# Patient Record
Sex: FEMALE | Race: WHITE | NOT HISPANIC OR LATINO | Employment: UNEMPLOYED | URBAN - METROPOLITAN AREA
[De-identification: names, ages, dates, MRNs, and addresses within clinical notes are randomized per-mention and may not be internally consistent; named-entity substitution may affect disease eponyms.]

---

## 2019-06-13 RX ORDER — LEVOTHYROXINE SODIUM 75 UG/1
TABLET ORAL
Qty: 30 TABLET | Refills: 6 | Status: SHIPPED | OUTPATIENT
Start: 2019-06-13 | End: 2020-02-03

## 2019-06-13 RX ORDER — ALBUTEROL SULFATE 90 UG/1
AEROSOL, METERED RESPIRATORY (INHALATION)
Qty: 9 EACH | Refills: 5 | Status: SHIPPED | OUTPATIENT
Start: 2019-06-13 | End: 2019-10-08 | Stop reason: SDUPTHER

## 2019-09-18 ENCOUNTER — TELEPHONE (OUTPATIENT)
Dept: PRIMARY CARE CLINIC | Facility: CLINIC | Age: 59
End: 2019-09-18

## 2019-09-18 DIAGNOSIS — R73.09 ABNORMAL GLUCOSE TOLERANCE TEST: ICD-10-CM

## 2019-09-18 DIAGNOSIS — E78.00 HYPERCHOLESTEREMIA: Primary | ICD-10-CM

## 2019-09-18 DIAGNOSIS — E03.9 HYPOTHYROIDISM, UNSPECIFIED TYPE: ICD-10-CM

## 2019-09-18 NOTE — TELEPHONE ENCOUNTER
"She would like to have the "regular lab work orders put in for my wellness exam.I also want an A1C and and my thyroid checked ."  "

## 2019-09-23 ENCOUNTER — OFFICE VISIT (OUTPATIENT)
Dept: PRIMARY CARE CLINIC | Facility: CLINIC | Age: 59
End: 2019-09-23
Payer: COMMERCIAL

## 2019-09-23 VITALS
RESPIRATION RATE: 14 BRPM | WEIGHT: 179 LBS | DIASTOLIC BLOOD PRESSURE: 62 MMHG | HEART RATE: 73 BPM | SYSTOLIC BLOOD PRESSURE: 120 MMHG | OXYGEN SATURATION: 96 %

## 2019-09-23 DIAGNOSIS — R10.32 LLQ PAIN: ICD-10-CM

## 2019-09-23 DIAGNOSIS — K57.92 DIVERTICULITIS: Primary | ICD-10-CM

## 2019-09-23 PROBLEM — J45.909 ASTHMA: Status: ACTIVE | Noted: 2019-09-23

## 2019-09-23 PROBLEM — K21.9 GASTROESOPHAGEAL REFLUX DISEASE: Status: ACTIVE | Noted: 2019-09-23

## 2019-09-23 PROBLEM — L30.9 ECZEMA: Status: ACTIVE | Noted: 2019-09-23

## 2019-09-23 PROBLEM — E55.9 VITAMIN D DEFICIENCY: Status: ACTIVE | Noted: 2019-09-23

## 2019-09-23 PROBLEM — D64.9 ANEMIA: Status: ACTIVE | Noted: 2019-09-23

## 2019-09-23 PROBLEM — E03.9 HYPOTHYROIDISM: Status: ACTIVE | Noted: 2019-09-23

## 2019-09-23 PROBLEM — J30.9 ALLERGIC RHINITIS: Status: ACTIVE | Noted: 2019-09-23

## 2019-09-23 PROCEDURE — 99214 PR OFFICE/OUTPT VISIT, EST, LEVL IV, 30-39 MIN: ICD-10-PCS | Mod: S$GLB,,, | Performed by: FAMILY MEDICINE

## 2019-09-23 PROCEDURE — 99214 OFFICE O/P EST MOD 30 MIN: CPT | Mod: S$GLB,,, | Performed by: FAMILY MEDICINE

## 2019-09-23 RX ORDER — PNV NO.95/FERROUS FUM/FOLIC AC 28MG-0.8MG
100 TABLET ORAL DAILY
COMMUNITY

## 2019-09-23 RX ORDER — ASPIRIN 81 MG/1
81 TABLET ORAL DAILY
COMMUNITY

## 2019-09-23 RX ORDER — AMOXICILLIN AND CLAVULANATE POTASSIUM 875; 125 MG/1; MG/1
1 TABLET, FILM COATED ORAL EVERY 12 HOURS
Qty: 28 TABLET | Refills: 0 | Status: SHIPPED | OUTPATIENT
Start: 2019-09-23 | End: 2019-10-03 | Stop reason: SDUPTHER

## 2019-09-23 RX ORDER — MAGNESIUM 30 MG
TABLET ORAL ONCE
COMMUNITY

## 2019-09-23 RX ORDER — CHOLECALCIFEROL (VITAMIN D3) 25 MCG
1000 TABLET ORAL DAILY
COMMUNITY

## 2019-09-23 RX ORDER — CETIRIZINE HYDROCHLORIDE 10 MG/1
10 TABLET ORAL DAILY
COMMUNITY

## 2019-09-23 NOTE — PROGRESS NOTES
"Subjective:       Patient ID: Crystal Quintero is a 58 y.o. female.    Chief Complaint: Constipation (She has diverticulosis. She said she believes she has had a "flare up" since Friday but has not been diaganosed with it. She has had pain,bloating,  and  fever. Friday she took advil.)    Patient presents with abdominal pain.  She has been having this over the last couple of years.  It is presumed to be diverticulitis.  She has seen Dr. Cochran multiple times for this.  She has not had the colonoscopy or the CT due to financial concerns as well as the fact that her insurance takes a while to get a prior authorization and it is only good for 30 days.  Dr. Cochran wants her to get it in the middle of a flare.  This has proven difficult.  The frequency of her flare ups have increased.  It used to be every 6 months and now every 3 months and this last time 4 weeks.  She has never taken antibiotics for it due to fears of complications and side effects.  She has never required hospitalization.  Her symptoms include nausea anorexia and abdominal pain.  Usually it is on the right but this time it is on the left lower quadrant.  She denies any diarrhea.  She states she may have a mild case of constipation on and off.  Her diet is not changed.  She states it is probably low in fiber .  She does state that when she does avid exercise is it tends to make it worse but not always.  She does not associated with any particular foods.  She denies any heartburn.  She does state that she usually has a low-grade temp with it.  Her sister has diverticulitis and was recently treated with Augmentin.  She states that this helped a lot and she is willing to take this if this is something that she could take.    Review of Systems   Constitutional: Negative for chills, fatigue, fever and unexpected weight change.   Eyes: Negative for visual disturbance.   Respiratory: Negative for cough, shortness of breath and wheezing.    Cardiovascular: Negative " for chest pain and palpitations.   Gastrointestinal: Positive for abdominal distention, abdominal pain and constipation. Negative for blood in stool.   Genitourinary: Negative for difficulty urinating and dysuria.   Musculoskeletal: Negative for back pain.   Skin: Negative for rash.   Neurological: Negative for dizziness, syncope, light-headedness, numbness and headaches.   Hematological: Negative for adenopathy.   Psychiatric/Behavioral: Negative for dysphoric mood. The patient is not nervous/anxious.      Medication List with Changes/Refills   New Medications    AMOXICILLIN-CLAVULANATE 875-125MG (AUGMENTIN) 875-125 MG PER TABLET    Take 1 tablet by mouth every 12 (twelve) hours.   Current Medications    ALBUTEROL (PROVENTIL/VENTOLIN HFA) 90 MCG/ACTUATION INHALER    INHALE TWO PUFFS BY MOUTH EVERY 4 HOURS AS NEEDED    ASCORBIC ACID, VITAMIN C, (VITAMIN C) 100 MG TABLET    Take 100 mg by mouth once daily.    ASPIRIN (ECOTRIN) 81 MG EC TABLET    Take 81 mg by mouth once daily.    CETIRIZINE (ZYRTEC) 10 MG TABLET    Take 10 mg by mouth once daily.    CYANOCOBALAMIN (VITAMIN B-12) 100 MCG TABLET    Take 100 mcg by mouth once daily.    MAGNESIUM 30 MG TAB    Take by mouth once.    PHYTONADIONE, VIT K1, (VITAMIN K1 MISC)    by Misc.(Non-Drug; Combo Route) route.    SYNTHROID 75 MCG TABLET    TAKE 1 TABLET BY MOUTH ONCE DAILY    VITAMIN D (VITAMIN D3) 1000 UNITS TAB    Take 1,000 Units by mouth once daily.    VITAMIN E 100 UNIT CAPSULE    Take 100 Units by mouth once daily.         Objective:      /62   Pulse 73   Resp 14   Wt 81.2 kg (179 lb)   SpO2 96%   There is no height or weight on file to calculate BMI.  Physical Exam   Constitutional: She is oriented to person, place, and time. She appears well-developed and well-nourished.   HENT:   Head: Normocephalic and atraumatic.   Eyes: Conjunctivae and EOM are normal.   Neck: Neck supple. No thyromegaly present.   Cardiovascular: Normal rate, regular rhythm and  intact distal pulses.   No murmur heard.  Pulmonary/Chest: Effort normal and breath sounds normal.   Abdominal: Soft. Bowel sounds are normal. She exhibits no mass. There is tenderness (Right lower quadrant mildly.  ). There is no rebound and no guarding.   Musculoskeletal: Normal range of motion.   Neurological: She is alert and oriented to person, place, and time.   Skin: Skin is dry. No rash noted.   Psychiatric: She has a normal mood and affect.   Vitals reviewed.      Assessment:       Problem List Items Addressed This Visit     None      Visit Diagnoses     Diverticulitis    -  Primary    Relevant Medications    amoxicillin-clavulanate 875-125mg (AUGMENTIN) 875-125 mg per tablet    LLQ pain        Relevant Orders    CT Abdomen Pelvis  Without Contrast    CBC auto differential    Comprehensive metabolic panel            Plan:       Diverticulitis  -     amoxicillin-clavulanate 875-125mg (AUGMENTIN) 875-125 mg per tablet; Take 1 tablet by mouth every 12 (twelve) hours.  Dispense: 28 tablet; Refill: 0    LLQ pain  -     CT Abdomen Pelvis  Without Contrast; Future; Expected date: 09/23/2019  -     CBC auto differential; Future; Expected date: 09/23/2019  -     Comprehensive metabolic panel; Future; Expected date: 09/23/2019              DISCUSSION:  We will cover with Augmentin after a long discussion on the pros and cons of this.  Order a CT scan and have this pending for another flare up.  Get blood work at her next visit for her annual wellness.  We discussed at length importance of plenty of fluids and increasing her vegetable intake.  We discussed for her to go extremely slowly with this.

## 2019-10-02 ENCOUNTER — TELEPHONE (OUTPATIENT)
Dept: PRIMARY CARE CLINIC | Facility: CLINIC | Age: 59
End: 2019-10-02

## 2019-10-02 DIAGNOSIS — K57.92 DIVERTICULITIS: ICD-10-CM

## 2019-10-02 NOTE — TELEPHONE ENCOUNTER
"She would like a refill on her Augmentin. She said it helped when it was given to her on 0923/19. She would like one more week to be called out because she feels like she is " not completely better."  "

## 2019-10-03 RX ORDER — AMOXICILLIN AND CLAVULANATE POTASSIUM 875; 125 MG/1; MG/1
1 TABLET, FILM COATED ORAL EVERY 12 HOURS
Qty: 28 TABLET | Refills: 0 | Status: SHIPPED | OUTPATIENT
Start: 2019-10-03 | End: 2019-11-05 | Stop reason: SDUPTHER

## 2019-10-08 DIAGNOSIS — J45.909 ASTHMA, UNSPECIFIED ASTHMA SEVERITY, UNSPECIFIED WHETHER COMPLICATED, UNSPECIFIED WHETHER PERSISTENT: Primary | ICD-10-CM

## 2019-10-08 RX ORDER — ALBUTEROL SULFATE 90 UG/1
2 AEROSOL, METERED RESPIRATORY (INHALATION) EVERY 4 HOURS PRN
Qty: 9 EACH | Refills: 5 | Status: SHIPPED | OUTPATIENT
Start: 2019-10-08 | End: 2020-10-26 | Stop reason: ALTCHOICE

## 2019-10-09 DIAGNOSIS — J45.909 ASTHMA, UNSPECIFIED ASTHMA SEVERITY, UNSPECIFIED WHETHER COMPLICATED, UNSPECIFIED WHETHER PERSISTENT: Primary | ICD-10-CM

## 2019-10-09 RX ORDER — ALBUTEROL SULFATE 90 UG/1
2 AEROSOL, METERED RESPIRATORY (INHALATION) EVERY 6 HOURS PRN
Qty: 18 G | Refills: 6 | Status: SHIPPED | OUTPATIENT
Start: 2019-10-09 | End: 2019-11-05 | Stop reason: SDUPTHER

## 2019-10-09 RX ORDER — ALBUTEROL SULFATE 90 UG/1
2 AEROSOL, METERED RESPIRATORY (INHALATION) EVERY 6 HOURS PRN
COMMUNITY
End: 2019-10-09 | Stop reason: SDUPTHER

## 2019-10-26 LAB
ALBUMIN SERPL-MCNC: 4.1 G/DL (ref 3.6–5.1)
ALBUMIN/GLOB SERPL: 1.3 (CALC) (ref 1–2.5)
ALP SERPL-CCNC: 47 U/L (ref 33–130)
ALT SERPL-CCNC: 19 U/L (ref 6–29)
AST SERPL-CCNC: 17 U/L (ref 10–35)
BASOPHILS # BLD AUTO: 41 CELLS/UL (ref 0–200)
BASOPHILS NFR BLD AUTO: 0.6 %
BILIRUB SERPL-MCNC: 0.9 MG/DL (ref 0.2–1.2)
BUN SERPL-MCNC: 8 MG/DL (ref 7–25)
BUN/CREAT SERPL: NORMAL (CALC) (ref 6–22)
CALCIUM SERPL-MCNC: 9.3 MG/DL (ref 8.6–10.4)
CHLORIDE SERPL-SCNC: 102 MMOL/L (ref 98–110)
CHOLEST SERPL-MCNC: 222 MG/DL
CHOLEST/HDLC SERPL: 4 (CALC)
CO2 SERPL-SCNC: 28 MMOL/L (ref 20–32)
CREAT SERPL-MCNC: 0.87 MG/DL (ref 0.5–1.05)
EOSINOPHIL # BLD AUTO: 531 CELLS/UL (ref 15–500)
EOSINOPHIL NFR BLD AUTO: 7.7 %
ERYTHROCYTE [DISTWIDTH] IN BLOOD BY AUTOMATED COUNT: 12.8 % (ref 11–15)
GFRSERPLBLD MDRD-ARVRAT: 74 ML/MIN/1.73M2
GLOBULIN SER CALC-MCNC: 3.1 G/DL (CALC) (ref 1.9–3.7)
GLUCOSE SERPL-MCNC: 84 MG/DL (ref 65–99)
HBA1C MFR BLD: 6.1 % OF TOTAL HGB
HCT VFR BLD AUTO: 39.2 % (ref 35–45)
HDLC SERPL-MCNC: 55 MG/DL
HGB BLD-MCNC: 13.6 G/DL (ref 11.7–15.5)
LDLC SERPL CALC-MCNC: 145 MG/DL (CALC)
LYMPHOCYTES # BLD AUTO: 1691 CELLS/UL (ref 850–3900)
LYMPHOCYTES NFR BLD AUTO: 24.5 %
MCH RBC QN AUTO: 30.3 PG (ref 27–33)
MCHC RBC AUTO-ENTMCNC: 34.7 G/DL (ref 32–36)
MCV RBC AUTO: 87.3 FL (ref 80–100)
MONOCYTES # BLD AUTO: 380 CELLS/UL (ref 200–950)
MONOCYTES NFR BLD AUTO: 5.5 %
NEUTROPHILS # BLD AUTO: 4257 CELLS/UL (ref 1500–7800)
NEUTROPHILS NFR BLD AUTO: 61.7 %
NONHDLC SERPL-MCNC: 167 MG/DL (CALC)
PLATELET # BLD AUTO: 332 THOUSAND/UL (ref 140–400)
PMV BLD REES-ECKER: 9.4 FL (ref 7.5–12.5)
POTASSIUM SERPL-SCNC: 4.4 MMOL/L (ref 3.5–5.3)
PROT SERPL-MCNC: 7.2 G/DL (ref 6.1–8.1)
RBC # BLD AUTO: 4.49 MILLION/UL (ref 3.8–5.1)
SODIUM SERPL-SCNC: 139 MMOL/L (ref 135–146)
T4 FREE SERPL-MCNC: 1 NG/DL (ref 0.8–1.8)
TRIGL SERPL-MCNC: 104 MG/DL
TSH SERPL-ACNC: 9.87 MIU/L (ref 0.4–4.5)
WBC # BLD AUTO: 6.9 THOUSAND/UL (ref 3.8–10.8)

## 2019-11-05 ENCOUNTER — OFFICE VISIT (OUTPATIENT)
Dept: PRIMARY CARE CLINIC | Facility: CLINIC | Age: 59
End: 2019-11-05
Payer: COMMERCIAL

## 2019-11-05 VITALS
HEIGHT: 66 IN | OXYGEN SATURATION: 98 % | SYSTOLIC BLOOD PRESSURE: 100 MMHG | RESPIRATION RATE: 14 BRPM | DIASTOLIC BLOOD PRESSURE: 72 MMHG | BODY MASS INDEX: 28.93 KG/M2 | HEART RATE: 76 BPM | WEIGHT: 180 LBS

## 2019-11-05 DIAGNOSIS — R73.09 ABNORMAL GLUCOSE: ICD-10-CM

## 2019-11-05 DIAGNOSIS — K57.92 DIVERTICULITIS: ICD-10-CM

## 2019-11-05 DIAGNOSIS — Z00.00 WELLNESS EXAMINATION: Primary | ICD-10-CM

## 2019-11-05 DIAGNOSIS — E03.9 HYPOTHYROIDISM, UNSPECIFIED TYPE: ICD-10-CM

## 2019-11-05 DIAGNOSIS — E78.00 PURE HYPERCHOLESTEROLEMIA: ICD-10-CM

## 2019-11-05 DIAGNOSIS — J45.909 ASTHMA, UNSPECIFIED ASTHMA SEVERITY, UNSPECIFIED WHETHER COMPLICATED, UNSPECIFIED WHETHER PERSISTENT: ICD-10-CM

## 2019-11-05 PROCEDURE — 99396 PREV VISIT EST AGE 40-64: CPT | Mod: S$GLB,,, | Performed by: FAMILY MEDICINE

## 2019-11-05 PROCEDURE — 99396 PR PREVENTIVE VISIT,EST,40-64: ICD-10-PCS | Mod: S$GLB,,, | Performed by: FAMILY MEDICINE

## 2019-11-05 RX ORDER — AMOXICILLIN AND CLAVULANATE POTASSIUM 875; 125 MG/1; MG/1
1 TABLET, FILM COATED ORAL EVERY 12 HOURS
Qty: 28 TABLET | Refills: 0 | Status: SHIPPED | OUTPATIENT
Start: 2019-11-05 | End: 2020-02-19 | Stop reason: SDUPTHER

## 2019-11-05 RX ORDER — ALBUTEROL SULFATE 90 UG/1
2 AEROSOL, METERED RESPIRATORY (INHALATION) EVERY 6 HOURS PRN
Qty: 18 G | Refills: 11 | Status: SHIPPED | OUTPATIENT
Start: 2019-11-05 | End: 2020-11-11 | Stop reason: SDUPTHER

## 2019-11-05 NOTE — PROGRESS NOTES
Subjective:       Patient ID: Crystal Quintero is a 58 y.o. female.    Chief Complaint: Follow-up    Patient presents for her annual wellness exam.  She states overall she is feeling fairly well except for diverticulitis that has been on and off.  She is set to have a colonoscopy, but she has to be without any diverticulitis symptoms.  She has not been able to coordinate this with the GI.  She is currently getting over an other case of it.  Augmentin helps every time.   Also with history of hypothyroidism.  She denies any symptoms of this.  She is still on the 75 mcg of Synthroid.  She has blood work pending.  Also with history of pure hypercholesterolemia.  She is not on a statin medication.  It is time to recheck this and she has blood work pending.  She states her diet is very poor and she does not exercise.  Also with the history of diabetes in her 6 siblings and an abnormal glucose in the past.  Her last A1c that I have records for his 2011 and it was 6.1.  She has blood work pending.  Also with history of asthma.  She states this has been fairly well controlled.  She does need more ProAir HFA.  She states that she needs the brand name as the generic brand does not seem to help and makes her feel differently.          Review of Systems   Constitutional: Negative for chills, fatigue, fever and unexpected weight change.   Eyes: Negative for visual disturbance.   Respiratory: Negative for cough, shortness of breath and wheezing.    Cardiovascular: Negative for chest pain and palpitations.   Gastrointestinal: Positive for abdominal pain. Negative for blood in stool.   Genitourinary: Negative for difficulty urinating and dysuria.   Musculoskeletal: Negative for back pain.   Skin: Negative for rash.   Neurological: Negative for dizziness, syncope, light-headedness, numbness and headaches.   Hematological: Negative for adenopathy.   Psychiatric/Behavioral: Negative for dysphoric mood. The patient is not nervous/anxious.   "    Medication List with Changes/Refills   Current Medications    ALBUTEROL (PROVENTIL/VENTOLIN HFA) 90 MCG/ACTUATION INHALER    Inhale 2 puffs into the lungs every 4 (four) hours as needed. Rescue    ASCORBIC ACID, VITAMIN C, (VITAMIN C) 100 MG TABLET    Take 100 mg by mouth once daily.    ASPIRIN (ECOTRIN) 81 MG EC TABLET    Take 81 mg by mouth once daily.    CETIRIZINE (ZYRTEC) 10 MG TABLET    Take 10 mg by mouth once daily.    CYANOCOBALAMIN (VITAMIN B-12) 100 MCG TABLET    Take 100 mcg by mouth once daily.    MAGNESIUM 30 MG TAB    Take by mouth once.    PHYTONADIONE, VIT K1, (VITAMIN K1 MISC)    by Misc.(Non-Drug; Combo Route) route.    SYNTHROID 75 MCG TABLET    TAKE 1 TABLET BY MOUTH ONCE DAILY    VITAMIN D (VITAMIN D3) 1000 UNITS TAB    Take 1,000 Units by mouth once daily.    VITAMIN E 100 UNIT CAPSULE    Take 100 Units by mouth once daily.   Changed and/or Refilled Medications    Modified Medication Previous Medication    ALBUTEROL (PROAIR HFA) 90 MCG/ACTUATION INHALER albuterol (PROAIR HFA) 90 mcg/actuation inhaler       Inhale 2 puffs into the lungs every 6 (six) hours as needed for Wheezing. Rescue    Inhale 2 puffs into the lungs every 6 (six) hours as needed for Wheezing. Rescue    AMOXICILLIN-CLAVULANATE 875-125MG (AUGMENTIN) 875-125 MG PER TABLET amoxicillin-clavulanate 875-125mg (AUGMENTIN) 875-125 mg per tablet       Take 1 tablet by mouth every 12 (twelve) hours.    Take 1 tablet by mouth every 12 (twelve) hours.         Objective:      /72   Pulse 76   Resp 14   Ht 5' 6" (1.676 m)   Wt 81.6 kg (180 lb)   SpO2 98%   BMI 29.05 kg/m²   Estimated body mass index is 29.05 kg/m² as calculated from the following:    Height as of this encounter: 5' 6" (1.676 m).    Weight as of this encounter: 81.6 kg (180 lb).  Physical Exam   Constitutional: She is oriented to person, place, and time. She appears well-developed and well-nourished.   obese   HENT:   Head: Normocephalic and atraumatic. "   Eyes: Conjunctivae and EOM are normal.   Neck: Neck supple. No thyromegaly present.   Cardiovascular: Normal rate, regular rhythm and intact distal pulses.   No murmur heard.  Pulmonary/Chest: Effort normal and breath sounds normal.   Abdominal: Soft. Bowel sounds are normal. She exhibits no mass. There is tenderness (Minimally diffusely). There is no rebound and no guarding.   Musculoskeletal: Normal range of motion.   Neurological: She is alert and oriented to person, place, and time.   Skin: Skin is dry. No rash noted.   Psychiatric: She has a normal mood and affect.   Vitals reviewed.      Assessment:       Problem List Items Addressed This Visit        Pulmonary    Asthma    Relevant Medications    albuterol (PROAIR HFA) 90 mcg/actuation inhaler       Endocrine    Hypothyroidism    Relevant Orders    TSH    T3, free    T4, free      Other Visit Diagnoses     Wellness examination    -  Primary    Pure hypercholesterolemia        Relevant Orders    Lipid panel    Abnormal glucose        Relevant Orders    Hemoglobin A1c    Diverticulitis        Relevant Medications    amoxicillin-clavulanate 875-125mg (AUGMENTIN) 875-125 mg per tablet            Plan:       Wellness examination    Hypothyroidism, unspecified type  -     TSH; Future; Expected date: 03/02/2020  -     T3, free; Future; Expected date: 03/02/2020  -     T4, free; Future; Expected date: 03/02/2020    Pure hypercholesterolemia  -     Lipid panel; Future; Expected date: 03/02/2020    Abnormal glucose  -     Hemoglobin A1c; Future; Expected date: 03/02/2020    Asthma, unspecified asthma severity, unspecified whether complicated, unspecified whether persistent  -     albuterol (PROAIR HFA) 90 mcg/actuation inhaler; Inhale 2 puffs into the lungs every 6 (six) hours as needed for Wheezing. Rescue  Dispense: 18 g; Refill: 11    Diverticulitis  -     amoxicillin-clavulanate 875-125mg (AUGMENTIN) 875-125 mg per tablet; Take 1 tablet by mouth every 12 (twelve)  hours.  Dispense: 28 tablet; Refill: 0              DISCUSSION:  Blood work looks pretty well except for the A1c in the lipids.  We had a very long and detailed discussion on her diet and lack of exercise.  She will attempt these therapeutic lifestyle changes.  We will recheck her cholesterol and A1c in 3-4 months.  She is also asking for Augmentin to bring on vacation with her or to have at home in case she gets a flare-up on a weekend or a holiday.  We discussed the dangers of antibiotics in great detail.      Results for orders placed or performed in visit on 09/23/19   CBC auto differential   Result Value Ref Range    WBC 6.9 3.8 - 10.8 Thousand/uL    RBC 4.49 3.80 - 5.10 Million/uL    Hemoglobin 13.6 11.7 - 15.5 g/dL    Hematocrit 39.2 35.0 - 45.0 %    Mean Corpuscular Volume 87.3 80.0 - 100.0 fL    Mean Corpuscular Hemoglobin 30.3 27.0 - 33.0 pg    Mean Corpuscular Hemoglobin Conc 34.7 32.0 - 36.0 g/dL    RDW 12.8 11.0 - 15.0 %    Platelets 332 140 - 400 Thousand/uL    MPV 9.4 7.5 - 12.5 fL    Neutrophils Absolute 4,257 1,500 - 7,800 cells/uL    Lymph # 1,691 850 - 3,900 cells/uL    Mono # 380 200 - 950 cells/uL    Eos # 531 (H) 15 - 500 cells/uL    Baso # 41 0 - 200 cells/uL    Neutrophils Relative 61.7 %    Lymph% 24.5 %    Mono% 5.5 %    Eosinophil% 7.7 %    Basophil% 0.6 %   Comprehensive metabolic panel   Result Value Ref Range    Glucose 84 65 - 99 mg/dL    BUN, Bld 8 7 - 25 mg/dL    Creatinine 0.87 0.50 - 1.05 mg/dL    eGFR if non  74 > OR = 60 mL/min/1.73m2    eGFR if African American 86 > OR = 60 mL/min/1.73m2    BUN/Creatinine Ratio NOT APPLICABLE 6 - 22 (calc)    Sodium 139 135 - 146 mmol/L    Potassium 4.4 3.5 - 5.3 mmol/L    Chloride 102 98 - 110 mmol/L    CO2 28 20 - 32 mmol/L    Calcium 9.3 8.6 - 10.4 mg/dL    Total Protein 7.2 6.1 - 8.1 g/dL    Albumin 4.1 3.6 - 5.1 g/dL    Globulin, Total 3.1 1.9 - 3.7 g/dL (calc)    Albumin/Globulin Ratio 1.3 1.0 - 2.5 (calc)    Total  Bilirubin 0.9 0.2 - 1.2 mg/dL    Alkaline Phosphatase 47 33 - 130 U/L    AST 17 10 - 35 U/L    ALT 19 6 - 29 U/L

## 2019-11-21 DIAGNOSIS — Z91.09 ENVIRONMENTAL ALLERGIES: Primary | ICD-10-CM

## 2019-11-21 RX ORDER — MONTELUKAST SODIUM 10 MG/1
10 TABLET ORAL NIGHTLY
COMMUNITY
End: 2019-11-21 | Stop reason: SDUPTHER

## 2019-11-21 RX ORDER — MONTELUKAST SODIUM 10 MG/1
10 TABLET ORAL NIGHTLY
Qty: 90 TABLET | Refills: 3 | Status: SHIPPED | OUTPATIENT
Start: 2019-11-21 | End: 2020-12-28

## 2019-11-21 NOTE — TELEPHONE ENCOUNTER
Crystal called to get a refill on her Singulair.  States she stopped taking it due to insomnia but would like to restart it due to her allergies.

## 2020-01-20 ENCOUNTER — TELEPHONE (OUTPATIENT)
Dept: PRIMARY CARE CLINIC | Facility: CLINIC | Age: 60
End: 2020-01-20

## 2020-01-20 NOTE — TELEPHONE ENCOUNTER
----- Message from Brooke Chen sent at 1/20/2020  3:11 PM CST -----  Contact: Patient   Pt called and stated that she did blood work for an annual appt back in October and she was sent a bill from [facility]. She called [facility] and they told her the orders were not coded correctly and that Dr. Dimas would have to redo it to cancel out her bill

## 2020-01-20 NOTE — TELEPHONE ENCOUNTER
She just needs to bring the bill by. I was not back in the office yet when her orders were placed so I am unsure or what codes were put on the orders.

## 2020-02-03 RX ORDER — LEVOTHYROXINE SODIUM 75 UG/1
TABLET ORAL
Qty: 30 TABLET | Refills: 11 | Status: SHIPPED | OUTPATIENT
Start: 2020-02-03 | End: 2020-10-26 | Stop reason: ALTCHOICE

## 2020-02-19 ENCOUNTER — OFFICE VISIT (OUTPATIENT)
Dept: PRIMARY CARE CLINIC | Facility: CLINIC | Age: 60
End: 2020-02-19
Payer: COMMERCIAL

## 2020-02-19 VITALS
SYSTOLIC BLOOD PRESSURE: 106 MMHG | RESPIRATION RATE: 14 BRPM | HEART RATE: 85 BPM | DIASTOLIC BLOOD PRESSURE: 70 MMHG | WEIGHT: 179 LBS | BODY MASS INDEX: 28.77 KG/M2 | OXYGEN SATURATION: 97 % | HEIGHT: 66 IN

## 2020-02-19 DIAGNOSIS — J06.9 UPPER RESPIRATORY TRACT INFECTION, UNSPECIFIED TYPE: Primary | ICD-10-CM

## 2020-02-19 DIAGNOSIS — K57.92 DIVERTICULITIS: ICD-10-CM

## 2020-02-19 PROCEDURE — 3008F BODY MASS INDEX DOCD: CPT | Mod: CPTII,S$GLB,, | Performed by: FAMILY MEDICINE

## 2020-02-19 PROCEDURE — 99214 OFFICE O/P EST MOD 30 MIN: CPT | Mod: S$GLB,,, | Performed by: FAMILY MEDICINE

## 2020-02-19 PROCEDURE — 99214 PR OFFICE/OUTPT VISIT, EST, LEVL IV, 30-39 MIN: ICD-10-PCS | Mod: S$GLB,,, | Performed by: FAMILY MEDICINE

## 2020-02-19 PROCEDURE — 3008F PR BODY MASS INDEX (BMI) DOCUMENTED: ICD-10-PCS | Mod: CPTII,S$GLB,, | Performed by: FAMILY MEDICINE

## 2020-02-19 RX ORDER — AMOXICILLIN 500 MG/1
500 TABLET, FILM COATED ORAL EVERY 12 HOURS
Qty: 20 TABLET | Refills: 0 | Status: SHIPPED | OUTPATIENT
Start: 2020-02-19 | End: 2020-02-29

## 2020-02-19 RX ORDER — AMOXICILLIN AND CLAVULANATE POTASSIUM 875; 125 MG/1; MG/1
1 TABLET, FILM COATED ORAL EVERY 12 HOURS
Qty: 28 TABLET | Refills: 0 | Status: SHIPPED | OUTPATIENT
Start: 2020-02-19 | End: 2020-10-26

## 2020-02-19 RX ORDER — MONTELUKAST SODIUM 10 MG/1
10 TABLET ORAL NIGHTLY
COMMUNITY
End: 2020-10-26 | Stop reason: SDUPTHER

## 2020-02-19 NOTE — PROGRESS NOTES
Subjective:       Patient ID: Crystal Quintero is a 59 y.o. female.    Chief Complaint: Adenopathy    Pt with sore throat, sinus congestion, prod cough, chills.  X 8 days.  Hx of asthma.  Only mildly worse right now.  No ill contacts.  otc meds helping a little.  She denies any shortness of breath.  She states she feels a little bit better.  What really caused her concern was pain and swelling on the left side of her neck.    Review of Systems   Constitutional: Negative for chills, fatigue, fever and unexpected weight change.   HENT: Positive for congestion, sinus pressure and sore throat.    Eyes: Negative for visual disturbance.   Respiratory: Positive for cough. Negative for shortness of breath and wheezing.    Cardiovascular: Negative for chest pain and palpitations.   Gastrointestinal: Negative for abdominal pain and blood in stool.   Genitourinary: Negative for difficulty urinating and dysuria.   Musculoskeletal: Negative for back pain.   Skin: Negative for rash.   Neurological: Negative for dizziness, syncope, light-headedness, numbness and headaches.   Hematological: Negative for adenopathy.   Psychiatric/Behavioral: Negative for dysphoric mood. The patient is not nervous/anxious.      Medication List with Changes/Refills   New Medications    AMOXICILLIN (AMOXIL) 500 MG TAB    Take 1 tablet (500 mg total) by mouth every 12 (twelve) hours. for 10 days   Current Medications    ALBUTEROL (PROAIR HFA) 90 MCG/ACTUATION INHALER    Inhale 2 puffs into the lungs every 6 (six) hours as needed for Wheezing. Rescue    ALBUTEROL (PROVENTIL/VENTOLIN HFA) 90 MCG/ACTUATION INHALER    Inhale 2 puffs into the lungs every 4 (four) hours as needed. Rescue    ASCORBIC ACID, VITAMIN C, (VITAMIN C) 100 MG TABLET    Take 100 mg by mouth once daily.    ASPIRIN (ECOTRIN) 81 MG EC TABLET    Take 81 mg by mouth once daily.    CETIRIZINE (ZYRTEC) 10 MG TABLET    Take 10 mg by mouth once daily.    CYANOCOBALAMIN (VITAMIN B-12) 100 MCG TABLET  "   Take 100 mcg by mouth once daily.    MAGNESIUM 30 MG TAB    Take by mouth once.    MONTELUKAST (SINGULAIR) 10 MG TABLET    Take 1 tablet (10 mg total) by mouth every evening.    MONTELUKAST (SINGULAIR) 10 MG TABLET    Take 10 mg by mouth every evening.    PHYTONADIONE, VIT K1, (VITAMIN K1 MISC)    by Misc.(Non-Drug; Combo Route) route.    SYNTHROID 75 MCG TABLET    TAKE 1 TABLET BY MOUTH ONCE DAILY    VITAMIN D (VITAMIN D3) 1000 UNITS TAB    Take 1,000 Units by mouth once daily.    VITAMIN E 100 UNIT CAPSULE    Take 100 Units by mouth once daily.   Changed and/or Refilled Medications    Modified Medication Previous Medication    AMOXICILLIN-CLAVULANATE 875-125MG (AUGMENTIN) 875-125 MG PER TABLET amoxicillin-clavulanate 875-125mg (AUGMENTIN) 875-125 mg per tablet       Take 1 tablet by mouth every 12 (twelve) hours.    Take 1 tablet by mouth every 12 (twelve) hours.         Objective:      /70   Pulse 85   Resp 14   Ht 5' 6" (1.676 m)   Wt 81.2 kg (179 lb)   LMP  (LMP Unknown)   SpO2 97%   BMI 28.89 kg/m²   Estimated body mass index is 28.89 kg/m² as calculated from the following:    Height as of this encounter: 5' 6" (1.676 m).    Weight as of this encounter: 81.2 kg (179 lb).  Physical Exam   Constitutional: She is oriented to person, place, and time. She appears well-developed and well-nourished.   HENT:   Head: Normocephalic and atraumatic.   Right Ear: External ear normal.   Left Ear: External ear normal.   Nose: Nose normal.   Cobblestoning and a mild postnasal drip.   Eyes: Conjunctivae and EOM are normal.   Neck: No thyromegaly present.   Cardiovascular: Normal rate, regular rhythm and intact distal pulses.   No murmur heard.  Pulmonary/Chest: Effort normal and breath sounds normal.   Abdominal: Soft. Bowel sounds are normal. She exhibits no mass. There is no tenderness. There is no rebound and no guarding.   Musculoskeletal: Normal range of motion.   Lymphadenopathy:     She has cervical " adenopathy (left > right, ttp).   Neurological: She is alert and oriented to person, place, and time.   Skin: Skin is dry. No rash noted.   Psychiatric: She has a normal mood and affect.   Vitals reviewed.      Assessment:       Problem List Items Addressed This Visit     None      Visit Diagnoses     Upper respiratory tract infection, unspecified type    -  Primary    Relevant Medications    amoxicillin (AMOXIL) 500 MG Tab    Diverticulitis        Relevant Medications    amoxicillin-clavulanate 875-125mg (AUGMENTIN) 875-125 mg per tablet            Plan:       Upper respiratory tract infection, unspecified type  -     amoxicillin (AMOXIL) 500 MG Tab; Take 1 tablet (500 mg total) by mouth every 12 (twelve) hours. for 10 days  Dispense: 20 tablet; Refill: 0    Diverticulitis  -     amoxicillin-clavulanate 875-125mg (AUGMENTIN) 875-125 mg per tablet; Take 1 tablet by mouth every 12 (twelve) hours.  Dispense: 28 tablet; Refill: 0              DISCUSSION:  Her lungs sound great and her ears are normal.  Overall her exam is very benign.  I feel she is going to be getting better over the next few days.  If she does not then take the amoxicillin.  Rest over-the-counter in fluids.  She does get diverticulitis occasionally and is asking for Augmentin as this has worked very well for her in the past.  To ER if this worsens acutely.

## 2020-03-02 ENCOUNTER — OFFICE VISIT (OUTPATIENT)
Dept: PRIMARY CARE CLINIC | Facility: CLINIC | Age: 60
End: 2020-03-02
Payer: COMMERCIAL

## 2020-03-02 VITALS
WEIGHT: 184 LBS | BODY MASS INDEX: 29.57 KG/M2 | RESPIRATION RATE: 14 BRPM | SYSTOLIC BLOOD PRESSURE: 112 MMHG | DIASTOLIC BLOOD PRESSURE: 74 MMHG | HEIGHT: 66 IN | HEART RATE: 71 BPM | OXYGEN SATURATION: 96 %

## 2020-03-02 DIAGNOSIS — J34.0 CELLULITIS OF MUCOUS MEMBRANE OF NOSE: Primary | ICD-10-CM

## 2020-03-02 PROCEDURE — 99214 PR OFFICE/OUTPT VISIT, EST, LEVL IV, 30-39 MIN: ICD-10-PCS | Mod: S$GLB,,, | Performed by: FAMILY MEDICINE

## 2020-03-02 PROCEDURE — 99214 OFFICE O/P EST MOD 30 MIN: CPT | Mod: S$GLB,,, | Performed by: FAMILY MEDICINE

## 2020-03-02 PROCEDURE — 3008F PR BODY MASS INDEX (BMI) DOCUMENTED: ICD-10-PCS | Mod: CPTII,S$GLB,, | Performed by: FAMILY MEDICINE

## 2020-03-02 PROCEDURE — 3008F BODY MASS INDEX DOCD: CPT | Mod: CPTII,S$GLB,, | Performed by: FAMILY MEDICINE

## 2020-03-02 RX ORDER — SULFAMETHOXAZOLE AND TRIMETHOPRIM 800; 160 MG/1; MG/1
1 TABLET ORAL 2 TIMES DAILY
Qty: 10 TABLET | Refills: 0 | Status: SHIPPED | OUTPATIENT
Start: 2020-03-02 | End: 2020-10-26

## 2020-03-02 RX ORDER — MUPIROCIN 20 MG/G
OINTMENT TOPICAL 3 TIMES DAILY
Qty: 22 G | Refills: 0 | Status: SHIPPED | OUTPATIENT
Start: 2020-03-02 | End: 2020-09-30 | Stop reason: SDUPTHER

## 2020-03-02 NOTE — PROGRESS NOTES
Subjective:       Patient ID: Crystal Quintero is a 59 y.o. female.    Chief Complaint: Sore in Nose    Patient presents with swelling pain in her left nostril.  I saw her about a week and half ago for an upper respiratory infection.  It has slowly improved.  She did not have to take the antibiotics.  Three days ago these new symptoms began.  She has been putting Bactroban on it.  This was an un opened to a from 20 years ago.  She states it has not helped get.  She denies any fever and chills.    Review of Systems   Constitutional: Negative for chills, fatigue, fever and unexpected weight change.   HENT: Positive for congestion.    Eyes: Negative for visual disturbance.   Respiratory: Negative for cough, shortness of breath and wheezing.    Cardiovascular: Negative for chest pain and palpitations.   Gastrointestinal: Negative for abdominal pain and blood in stool.   Genitourinary: Negative for difficulty urinating and dysuria.   Musculoskeletal: Negative for back pain.   Skin: Negative for rash.   Neurological: Negative for dizziness, syncope, light-headedness, numbness and headaches.   Hematological: Negative for adenopathy.   Psychiatric/Behavioral: Negative for dysphoric mood. The patient is not nervous/anxious.      Medication List with Changes/Refills   New Medications    MUPIROCIN (BACTROBAN) 2 % OINTMENT    Apply topically 3 (three) times daily.    SULFAMETHOXAZOLE-TRIMETHOPRIM 800-160MG (BACTRIM DS) 800-160 MG TAB    Take 1 tablet by mouth 2 (two) times daily.   Current Medications    ALBUTEROL (PROAIR HFA) 90 MCG/ACTUATION INHALER    Inhale 2 puffs into the lungs every 6 (six) hours as needed for Wheezing. Rescue    ALBUTEROL (PROVENTIL/VENTOLIN HFA) 90 MCG/ACTUATION INHALER    Inhale 2 puffs into the lungs every 4 (four) hours as needed. Rescue    AMOXICILLIN-CLAVULANATE 875-125MG (AUGMENTIN) 875-125 MG PER TABLET    Take 1 tablet by mouth every 12 (twelve) hours.    ASCORBIC ACID, VITAMIN C, (VITAMIN C) 100 MG  "TABLET    Take 100 mg by mouth once daily.    ASPIRIN (ECOTRIN) 81 MG EC TABLET    Take 81 mg by mouth once daily.    CETIRIZINE (ZYRTEC) 10 MG TABLET    Take 10 mg by mouth once daily.    CYANOCOBALAMIN (VITAMIN B-12) 100 MCG TABLET    Take 100 mcg by mouth once daily.    MAGNESIUM 30 MG TAB    Take by mouth once.    MONTELUKAST (SINGULAIR) 10 MG TABLET    Take 1 tablet (10 mg total) by mouth every evening.    MONTELUKAST (SINGULAIR) 10 MG TABLET    Take 10 mg by mouth every evening.    PHYTONADIONE, VIT K1, (VITAMIN K1 MISC)    by Misc.(Non-Drug; Combo Route) route.    SYNTHROID 75 MCG TABLET    TAKE 1 TABLET BY MOUTH ONCE DAILY    VITAMIN D (VITAMIN D3) 1000 UNITS TAB    Take 1,000 Units by mouth once daily.    VITAMIN E 100 UNIT CAPSULE    Take 100 Units by mouth once daily.         Objective:      /74   Pulse 71   Resp 14   Ht 5' 6" (1.676 m)   Wt 83.5 kg (184 lb)   LMP  (LMP Unknown)   SpO2 96%   BMI 29.70 kg/m²   Estimated body mass index is 29.7 kg/m² as calculated from the following:    Height as of this encounter: 5' 6" (1.676 m).    Weight as of this encounter: 83.5 kg (184 lb).  Physical Exam   Constitutional: She appears well-developed and well-nourished.   HENT:   Head: Normocephalic and atraumatic.   Right Ear: External ear normal.   Left Ear: External ear normal.   Mouth/Throat: Oropharynx is clear and moist. No oropharyngeal exudate.   Left Nare with redness crusty discharge and edema. It extends into her nostril and on to her upper lip.   Eyes: Pupils are equal, round, and reactive to light. Conjunctivae and EOM are normal.   Neck: Normal range of motion. Neck supple.   Lymphadenopathy:     She has cervical adenopathy (Minimal.  This is improved from her previous visit.).   Vitals reviewed.      Assessment:       Problem List Items Addressed This Visit     None      Visit Diagnoses     Cellulitis of mucous membrane of nose    -  Primary    Relevant Medications    " sulfamethoxazole-trimethoprim 800-160mg (BACTRIM DS) 800-160 mg Tab    mupirocin (BACTROBAN) 2 % ointment            Plan:       Cellulitis of mucous membrane of nose  -     sulfamethoxazole-trimethoprim 800-160mg (BACTRIM DS) 800-160 mg Tab; Take 1 tablet by mouth 2 (two) times daily.  Dispense: 10 tablet; Refill: 0  -     mupirocin (BACTROBAN) 2 % ointment; Apply topically 3 (three) times daily.  Dispense: 22 g; Refill: 0              DISCUSSION:  Oral antibiotics as well as a new tube of Bactroban due to her old 1 being 20 years old.  Return to clinic if this does not improve and especially if it worsens.

## 2020-03-10 ENCOUNTER — TELEPHONE (OUTPATIENT)
Dept: PRIMARY CARE CLINIC | Facility: CLINIC | Age: 60
End: 2020-03-10

## 2020-03-10 NOTE — TELEPHONE ENCOUNTER
----- Message from Brooke Chen sent at 3/9/2020  4:34 PM CDT -----  Contact: Patient   Pt came in to leave a copy of her [facility] bill. She said the labs were supposed to be coded as a wellness. She came in with two of these bills a couple months ago but only one bill got re coded. She said her insurance needs this by 3/11. I put a copy of the bill in the back at your desk

## 2020-04-01 ENCOUNTER — TELEPHONE (OUTPATIENT)
Dept: PRIMARY CARE CLINIC | Facility: CLINIC | Age: 60
End: 2020-04-01

## 2020-04-01 DIAGNOSIS — E03.9 HYPOTHYROIDISM, UNSPECIFIED TYPE: Primary | ICD-10-CM

## 2020-04-01 RX ORDER — LEVOTHYROXINE SODIUM 100 UG/1
100 TABLET ORAL
Qty: 90 TABLET | Refills: 3 | Status: SHIPPED | OUTPATIENT
Start: 2020-04-01 | End: 2021-03-29

## 2020-04-01 NOTE — TELEPHONE ENCOUNTER
States she would like to change her synthroid dose. At her last visit she was informed to call back when she was ready to change the dose. At the time, she said you had wanted to increase but she was not having symptoms. She is now having symptoms. She is requesting brand only to be called out     I did inform her she may have to get labs re drawn. She verbalized understanding and had no further questions at this time

## 2020-04-01 NOTE — TELEPHONE ENCOUNTER
----- Message from Siomara Piper sent at 4/1/2020 10:55 AM CDT -----  Contact: self  states that she is ready for new synthroid dosage (only name brand), 3 month supply....[phone]      [facility] [address] [address]  Phone: [phone] Fax: [phone]

## 2020-06-25 ENCOUNTER — TELEPHONE (OUTPATIENT)
Dept: PRIMARY CARE CLINIC | Facility: CLINIC | Age: 60
End: 2020-06-25

## 2020-06-25 NOTE — TELEPHONE ENCOUNTER
Patient stated she would like to do the anitbody testing because she was sick last month with a persistent cough and slight fever. She thinks it might have been related but would like to be tested to see.

## 2020-06-25 NOTE — TELEPHONE ENCOUNTER
----- Message from La Butler sent at 6/25/2020  3:18 PM CDT -----  Regarding: pt  Pt would like consult with the nurse in regards to the antibodies testing . Please call back at [phone]

## 2020-06-26 ENCOUNTER — TELEPHONE (OUTPATIENT)
Dept: PRIMARY CARE CLINIC | Facility: CLINIC | Age: 60
End: 2020-06-26

## 2020-06-26 DIAGNOSIS — R05.9 COUGH: Primary | ICD-10-CM

## 2020-06-29 NOTE — TELEPHONE ENCOUNTER
The computer will not let me order this.  We need to call the lab in find out what we need to type, or we can getting contact with injury and ask him how to put this order in.

## 2020-09-04 ENCOUNTER — PATIENT OUTREACH (OUTPATIENT)
Dept: ADMINISTRATIVE | Facility: HOSPITAL | Age: 60
End: 2020-09-04

## 2020-09-04 NOTE — LETTER
AUTHORIZATION FOR RELEASE OF   CONFIDENTIAL INFORMATION    Dear Dr. Knight and Staff,    We are seeing Crystal Quintero, date of birth 1960, in the clinic at [facility]. Al Dimas MD is the patient's PCP. Crystal Quintero has an outstanding lab/procedure at the time we reviewed her chart. In order to help keep her health information updated, she has authorized us to request the following medical record(s):        (  )  MAMMOGRAM                                      ( X )  COLONOSCOPY         (  )  PAP SMEAR                                          (  )  OUTSIDE LAB RESULTS     (  )  DEXA SCAN                                          (  )  EYE EXAM            (  )  FOOT EXAM                                          (  )  ENTIRE RECORD     (  )  OUTSIDE IMMUNIZATIONS                 (  )  _______________         Please fax records to [facility], David P Heinen, MD, [phone]     If you have any questions, please contact Apolinar Ang LPN at [phone].           Patient Name: Crystal Quintero  : 1960  Patient Phone #: [phone]

## 2020-09-05 NOTE — PROGRESS NOTES
Immunizations reviewed. [facility] reviewed. Care Everywhere reviewed. [facility], [facility], and [facility] reviewed. Per chart review- Colonoscopy done in 2012 per Dr. Knight. EFAX sent to obtain records and  updated. Mammogram from 2016 located in [facility] and uploaded to . Chart review completed.

## 2020-09-30 DIAGNOSIS — J34.0 CELLULITIS OF MUCOUS MEMBRANE OF NOSE: ICD-10-CM

## 2020-10-01 RX ORDER — MUPIROCIN 20 MG/G
OINTMENT TOPICAL 3 TIMES DAILY
Qty: 22 G | Refills: 0 | Status: SHIPPED | OUTPATIENT
Start: 2020-10-01 | End: 2020-10-26

## 2020-10-26 PROBLEM — L30.9 ECZEMA: Status: RESOLVED | Noted: 2019-09-23 | Resolved: 2020-10-26

## 2020-10-26 PROBLEM — E03.9 HYPOTHYROIDISM: Status: RESOLVED | Noted: 2019-09-23 | Resolved: 2020-10-26

## 2020-10-26 PROBLEM — K21.9 GASTROESOPHAGEAL REFLUX DISEASE: Status: RESOLVED | Noted: 2019-09-23 | Resolved: 2020-10-26

## 2020-10-26 PROBLEM — J30.9 ALLERGIC RHINITIS: Status: RESOLVED | Noted: 2019-09-23 | Resolved: 2020-10-26

## 2020-10-26 PROBLEM — D64.9 ANEMIA: Status: RESOLVED | Noted: 2019-09-23 | Resolved: 2020-10-26

## 2020-10-26 PROBLEM — E55.9 VITAMIN D DEFICIENCY: Status: RESOLVED | Noted: 2019-09-23 | Resolved: 2020-10-26

## 2020-10-26 PROBLEM — J45.909 ASTHMA: Status: RESOLVED | Noted: 2019-09-23 | Resolved: 2020-10-26

## 2020-11-11 DIAGNOSIS — J45.909 ASTHMA, UNSPECIFIED ASTHMA SEVERITY, UNSPECIFIED WHETHER COMPLICATED, UNSPECIFIED WHETHER PERSISTENT: ICD-10-CM

## 2020-11-12 RX ORDER — ALBUTEROL SULFATE 90 UG/1
2 AEROSOL, METERED RESPIRATORY (INHALATION) EVERY 6 HOURS PRN
Qty: 18 G | Refills: 11 | Status: SHIPPED | OUTPATIENT
Start: 2020-11-12 | End: 2021-12-06

## 2020-11-27 ENCOUNTER — TELEPHONE (OUTPATIENT)
Dept: OBSTETRICS AND GYNECOLOGY | Facility: CLINIC | Age: 60
End: 2020-11-27

## 2020-11-27 NOTE — TELEPHONE ENCOUNTER
----- Message from Dexter Sanchez sent at 11/27/2020 11:47 AM CST -----  Contact: self  Type:  Patient Returning Call    Who Called:pt  Who Left Message for Patient:n/a  Does the patient know what this is regarding?:appt  Would the patient rather a call back or a response via [facility]? Call back  Best Call Back Number:[phone]  Additional Information: none

## 2020-11-27 NOTE — TELEPHONE ENCOUNTER
Attempted to contact patient, no answer. Left patient voice mail to return call to clinic to change appt date and time.

## 2020-12-01 ENCOUNTER — OFFICE VISIT (OUTPATIENT)
Dept: OBSTETRICS AND GYNECOLOGY | Facility: CLINIC | Age: 60
End: 2020-12-01
Payer: COMMERCIAL

## 2020-12-01 VITALS
HEIGHT: 65 IN | HEART RATE: 70 BPM | DIASTOLIC BLOOD PRESSURE: 72 MMHG | BODY MASS INDEX: 33.32 KG/M2 | WEIGHT: 200 LBS | SYSTOLIC BLOOD PRESSURE: 114 MMHG

## 2020-12-01 DIAGNOSIS — Z12.31 BREAST CANCER SCREENING BY MAMMOGRAM: ICD-10-CM

## 2020-12-01 DIAGNOSIS — Z01.419 ENCOUNTER FOR WELL WOMAN EXAM WITH ROUTINE GYNECOLOGICAL EXAM: Primary | ICD-10-CM

## 2020-12-01 DIAGNOSIS — Z13.820 OSTEOPOROSIS SCREENING: ICD-10-CM

## 2020-12-01 PROCEDURE — 3008F PR BODY MASS INDEX (BMI) DOCUMENTED: ICD-10-PCS | Mod: CPTII,S$GLB,, | Performed by: OBSTETRICS & GYNECOLOGY

## 2020-12-01 PROCEDURE — 3008F BODY MASS INDEX DOCD: CPT | Mod: CPTII,S$GLB,, | Performed by: OBSTETRICS & GYNECOLOGY

## 2020-12-01 PROCEDURE — 1126F PR PAIN SEVERITY QUANTIFIED, NO PAIN PRESENT: ICD-10-PCS | Mod: S$GLB,,, | Performed by: OBSTETRICS & GYNECOLOGY

## 2020-12-01 PROCEDURE — 1126F AMNT PAIN NOTED NONE PRSNT: CPT | Mod: S$GLB,,, | Performed by: OBSTETRICS & GYNECOLOGY

## 2020-12-01 PROCEDURE — 99396 PR PREVENTIVE VISIT,EST,40-64: ICD-10-PCS | Mod: S$GLB,,, | Performed by: OBSTETRICS & GYNECOLOGY

## 2020-12-01 PROCEDURE — 99396 PREV VISIT EST AGE 40-64: CPT | Mod: S$GLB,,, | Performed by: OBSTETRICS & GYNECOLOGY

## 2020-12-01 RX ORDER — ZINC GLUCONATE 50 MG
50 TABLET ORAL DAILY
COMMUNITY

## 2020-12-01 RX ORDER — FAMOTIDINE 20 MG/1
20 TABLET, FILM COATED ORAL 2 TIMES DAILY
COMMUNITY

## 2020-12-01 NOTE — PROGRESS NOTES
CC: Well Woman (menopausal)    HPI:  Patient is a 59 y.o.   who presents for her well woman exam today.  History reviewed with patient and changes noted.  Patient without complaints or concerns today.    Pt has noticed her lateral breasts are sometimes sore but has been on a lot of caffiene as well.  Will try off and if still sore, will change screening mmg to diagnostic    Past Medical History:   Diagnosis Date    Abnormal brain MRI     Abnormal EEG 2010    Abnormal mammogram     [facility] evaluated- normal since    Ankle fracture     Anxiety and depression     Arm fracture     right    Asthma     Diverticulosis     Eczema     History of abnormal cervical Pap smear     cryo 2008- normal after    History of Nissen fundoplication     Hypothyroid     Inguinal hernia     Kidney stones     Low vitamin D level     Menopausal state     Migraines     Spinal stenosis        Past Surgical History:   Procedure Laterality Date    arm fracture      CERVIX LESION DESTRUCTION      paps normal after    CHOLECYSTECTOMY      NISSEN FUNDOPLICATION      OPEN REDUCTION AND INTERNAL FIXATION (ORIF) OF FRACTURE OF LOWER LEG      sports injury       Social History     Tobacco Use    Smoking status: Never Smoker    Smokeless tobacco: Never Used   Substance Use Topics    Alcohol use: Yes     Frequency: Monthly or less     Drinks per session: 1 or 2     Binge frequency: Never    Drug use: Never       Family History   Problem Relation Age of Onset    Lung cancer Mother 58    Uterine cancer Sister 39    Basal cell carcinoma Sister        Review of patient's allergies indicates:   Allergen Reactions    Erythromycin     Iodine and iodide containing products      IV dye         Current Outpatient Medications:     albuterol (PROAIR HFA) 90 mcg/actuation inhaler, Inhale 2 puffs into the lungs every 6 (six) hours as needed for Wheezing. Rescue, Disp: 18 g, Rfl: 11    ascorbic acid, vitamin C,  (VITAMIN C) 100 MG tablet, Take 100 mg by mouth once daily., Disp: , Rfl:     aspirin (ECOTRIN) 81 MG EC tablet, Take 81 mg by mouth once daily., Disp: , Rfl:     cetirizine (ZYRTEC) 10 MG tablet, Take 10 mg by mouth once daily., Disp: , Rfl:     cyanocobalamin (VITAMIN B-12) 100 MCG tablet, Take 100 mcg by mouth once daily., Disp: , Rfl:     famotidine (PEPCID) 20 MG tablet, Take 20 mg by mouth 2 (two) times daily., Disp: , Rfl:     levothyroxine (SYNTHROID) 100 MCG tablet, Take 1 tablet (100 mcg total) by mouth before breakfast. Brand name only, this is medically necessary, Disp: 90 tablet, Rfl: 3    magnesium 30 mg Tab, Take by mouth once., Disp: , Rfl:     montelukast (SINGULAIR) 10 mg tablet, Take 1 tablet (10 mg total) by mouth every evening., Disp: 90 tablet, Rfl: 3    phytonadione, vit K1, (VITAMIN K1 MISC), by Misc.(Non-Drug; Combo Route) route., Disp: , Rfl:     vitamin D (VITAMIN D3) 1000 units Tab, Take 1,000 Units by mouth once daily., Disp: , Rfl:     vitamin E 100 UNIT capsule, Take 100 Units by mouth once daily., Disp: , Rfl:     zinc gluconate 50 mg tablet, Take 50 mg by mouth once daily., Disp: , Rfl:     OB History        3    Para   3    Term   2       1    AB        Living   3       SAB        TAB        Ectopic        Multiple        Live Births   3                  GYN HX:    HX ABNL PAPS:  yes- had cryo yrs ago but normal since    HX OF STDS:  none    MENOPAUSAL SINCE:     HRT USE: never used    HEALTH MAINTENANCE:  (PCP-Al Dimas MD)    LAST ANNUAL/ PAP:   2019       LAST PAP:  neg    LAST MMG:  2019  normal at [facility]     LAST LABS: due now- does with pcp    LAST COLON:  neg- Q 10 yrs- w/ Dr. Cochran     ROS:  Review of Systems   Constitutional: Negative for activity change, appetite change, chills, fatigue, fever and unexpected weight change.   Respiratory: Negative for cough and shortness of breath.    Cardiovascular: Negative  "for chest pain and leg swelling.   Gastrointestinal: Negative for abdominal pain, bloating, blood in stool, constipation, diarrhea, nausea and vomiting.   Endocrine: Negative for hair loss and hot flashes.   Genitourinary: Negative for decreased libido, dyspareunia, dysuria, flank pain, frequency, hematuria, hot flashes, pelvic pain, urgency, vaginal bleeding, vaginal discharge, urinary incontinence, postmenopausal bleeding, vaginal dryness and vaginal odor.   Musculoskeletal: Negative for arthralgias and joint swelling.   Integumentary:  Negative for rash, acne, mole/lesion, breast mass, nipple discharge, breast skin changes and breast tenderness.   Neurological: Negative for headaches.   Psychiatric/Behavioral: Negative for depression and sleep disturbance. The patient is not nervous/anxious.    Breast: Negative for asymmetry, lump, mass, nipple discharge, skin changes and tenderness      VITALS:  No LMP recorded (lmp unknown). Patient is postmenopausal.  Vitals:    12/01/20 1445   BP: 114/72   BP Location: Left arm   Patient Position: Sitting   BP Method: Medium (Automatic)   Pulse: 70   Weight: 90.7 kg (200 lb)   Height: 5' 5" (1.651 m)     Body mass index is 33.28 kg/m².     PHYSICAL EXAM:  Physical Exam:   Constitutional: She is oriented to person, place, and time. She appears well-developed and well-nourished. She is cooperative. No distress.    HENT:   Head: Normocephalic and atraumatic.     Neck: No thyroid mass present.    Cardiovascular: Normal rate.     Pulmonary/Chest: Effort normal. No respiratory distress. Right breast exhibits no inverted nipple, no mass, no nipple discharge, no skin change, no tenderness and no swelling. Left breast exhibits no inverted nipple, no mass, no nipple discharge, no skin change, no tenderness and no swelling. Breasts are symmetrical.        Abdominal: Soft. Normal appearance. She exhibits no distension. There is no abdominal tenderness.     Genitourinary:    Vagina and " uterus normal.      Pelvic exam was performed with patient supine.   There is no rash, tenderness, lesion or injury on the right labia. There is no rash, tenderness, lesion or injury on the left labia. Uterus is not enlarged, not fixed, not tender and not experiencing uterine prolapse. Cervix is normal. Right adnexum displays no mass, no tenderness and no fullness. Left adnexum displays no mass, no tenderness and no fullness. No erythema, tenderness, bleeding, rectocele, cystocele or unspecified prolapse of vaginal walls in the vagina. Labial bartholins normal.Cervix exhibits no motion tenderness, no discharge and no friability. negative for vaginal discharge          Musculoskeletal: Moves all extremeties. No edema.       Neurological: She is alert and oriented to person, place, and time.    Skin: Skin is warm and dry. No lesion and no rash noted.    Psychiatric: She has a normal mood and affect. Her speech is normal and behavior is normal. Judgment and thought content normal.     *female chaperone present for exam    ASSESSMENT and PLAN:  Encounter for well woman exam with routine gynecological exam  -     Liquid-based pap smear, screening    Breast cancer screening by mammogram  -     Mammo Digital Screening Bilat w/ Ashok; Future; Expected date: 12/30/2020    Osteoporosis screening  -     DXA Bone Density Spine And Hip; Future; Expected date: 01/01/2021       FOLLOWUP:  Follow up in about 1 year (around 12/1/2021) for wwe.     COUNSELING:  Patient was counseled today on A.C.S. Pap guidelines and recommendations for yearly pelvic exam, monthly self breast exams, annual mammograms, and screening colonoscopy starting at age 50. Encouraged patient to see her PCP for other health maintenance.

## 2021-01-13 ENCOUNTER — TELEPHONE (OUTPATIENT)
Dept: PRIMARY CARE CLINIC | Facility: CLINIC | Age: 61
End: 2021-01-13

## 2021-01-14 ENCOUNTER — PATIENT OUTREACH (OUTPATIENT)
Dept: ADMINISTRATIVE | Facility: HOSPITAL | Age: 61
End: 2021-01-14

## 2021-01-14 ENCOUNTER — OFFICE VISIT (OUTPATIENT)
Dept: PRIMARY CARE CLINIC | Facility: CLINIC | Age: 61
End: 2021-01-14
Payer: COMMERCIAL

## 2021-01-14 VITALS
RESPIRATION RATE: 18 BRPM | SYSTOLIC BLOOD PRESSURE: 136 MMHG | HEART RATE: 79 BPM | BODY MASS INDEX: 33.15 KG/M2 | WEIGHT: 199 LBS | OXYGEN SATURATION: 97 % | HEIGHT: 65 IN | DIASTOLIC BLOOD PRESSURE: 80 MMHG

## 2021-01-14 DIAGNOSIS — L21.9 SEBORRHEA: ICD-10-CM

## 2021-01-14 DIAGNOSIS — B02.9 HERPES ZOSTER WITHOUT COMPLICATION: Primary | ICD-10-CM

## 2021-01-14 PROCEDURE — 3008F PR BODY MASS INDEX (BMI) DOCUMENTED: ICD-10-PCS | Mod: CPTII,S$GLB,, | Performed by: FAMILY MEDICINE

## 2021-01-14 PROCEDURE — 99214 PR OFFICE/OUTPT VISIT, EST, LEVL IV, 30-39 MIN: ICD-10-PCS | Mod: S$GLB,,, | Performed by: FAMILY MEDICINE

## 2021-01-14 PROCEDURE — 3008F BODY MASS INDEX DOCD: CPT | Mod: CPTII,S$GLB,, | Performed by: FAMILY MEDICINE

## 2021-01-14 PROCEDURE — 99214 OFFICE O/P EST MOD 30 MIN: CPT | Mod: S$GLB,,, | Performed by: FAMILY MEDICINE

## 2021-01-14 RX ORDER — VALACYCLOVIR HYDROCHLORIDE 500 MG/1
500 TABLET, FILM COATED ORAL 3 TIMES DAILY
Qty: 21 TABLET | Refills: 0 | Status: SHIPPED | OUTPATIENT
Start: 2021-01-14 | End: 2022-01-14

## 2021-01-14 RX ORDER — METHYLPREDNISOLONE 4 MG/1
TABLET ORAL
Qty: 1 PACKAGE | Refills: 0 | Status: SHIPPED | OUTPATIENT
Start: 2021-01-14 | End: 2022-01-19

## 2021-08-02 RX ORDER — AMOXICILLIN AND CLAVULANATE POTASSIUM 875; 125 MG/1; MG/1
1 TABLET, FILM COATED ORAL
COMMUNITY
End: 2021-08-02 | Stop reason: SDUPTHER

## 2021-08-03 RX ORDER — AMOXICILLIN AND CLAVULANATE POTASSIUM 875; 125 MG/1; MG/1
1 TABLET, FILM COATED ORAL EVERY 12 HOURS
Qty: 20 TABLET | Refills: 0 | Status: SHIPPED | OUTPATIENT
Start: 2021-08-03 | End: 2022-01-19

## 2021-08-18 ENCOUNTER — TELEPHONE (OUTPATIENT)
Dept: OBSTETRICS AND GYNECOLOGY | Facility: CLINIC | Age: 61
End: 2021-08-18

## 2021-08-19 ENCOUNTER — TELEPHONE (OUTPATIENT)
Dept: OBSTETRICS AND GYNECOLOGY | Facility: CLINIC | Age: 61
End: 2021-08-19

## 2021-08-19 ENCOUNTER — PATIENT MESSAGE (OUTPATIENT)
Dept: OBSTETRICS AND GYNECOLOGY | Facility: CLINIC | Age: 61
End: 2021-08-19

## 2021-08-23 ENCOUNTER — TELEPHONE (OUTPATIENT)
Dept: OBSTETRICS AND GYNECOLOGY | Facility: CLINIC | Age: 61
End: 2021-08-23

## 2021-09-13 ENCOUNTER — TELEPHONE (OUTPATIENT)
Dept: PRIMARY CARE CLINIC | Facility: CLINIC | Age: 61
End: 2021-09-13

## 2021-12-07 ENCOUNTER — TELEPHONE (OUTPATIENT)
Dept: PRIMARY CARE CLINIC | Facility: CLINIC | Age: 61
End: 2021-12-07
Payer: COMMERCIAL

## 2021-12-07 DIAGNOSIS — J45.909 ASTHMA, UNSPECIFIED ASTHMA SEVERITY, UNSPECIFIED WHETHER COMPLICATED, UNSPECIFIED WHETHER PERSISTENT: ICD-10-CM

## 2021-12-07 DIAGNOSIS — R05.9 COUGH: Primary | ICD-10-CM

## 2021-12-07 RX ORDER — ALBUTEROL SULFATE 90 UG/1
2 AEROSOL, METERED RESPIRATORY (INHALATION) EVERY 6 HOURS PRN
Qty: 18 G | Refills: 11 | Status: SHIPPED | OUTPATIENT
Start: 2021-12-07 | End: 2022-01-19

## 2022-01-19 ENCOUNTER — OFFICE VISIT (OUTPATIENT)
Dept: OBSTETRICS AND GYNECOLOGY | Facility: CLINIC | Age: 62
End: 2022-01-19
Payer: COMMERCIAL

## 2022-01-19 VITALS
WEIGHT: 192 LBS | HEIGHT: 65 IN | BODY MASS INDEX: 31.99 KG/M2 | HEART RATE: 58 BPM | SYSTOLIC BLOOD PRESSURE: 120 MMHG | DIASTOLIC BLOOD PRESSURE: 81 MMHG

## 2022-01-19 DIAGNOSIS — Z12.31 BREAST CANCER SCREENING BY MAMMOGRAM: ICD-10-CM

## 2022-01-19 DIAGNOSIS — N64.4 BREAST PAIN: ICD-10-CM

## 2022-01-19 DIAGNOSIS — Z01.419 ENCOUNTER FOR WELL WOMAN EXAM WITH ROUTINE GYNECOLOGICAL EXAM: Primary | ICD-10-CM

## 2022-01-19 DIAGNOSIS — Z15.09 HNPCC (HEREDITARY NONPOLYPOSIS COLORECTAL CANCER) SYNDROME: ICD-10-CM

## 2022-01-19 DIAGNOSIS — R22.32 AXILLARY MASS, LEFT: ICD-10-CM

## 2022-01-19 DIAGNOSIS — N95.1 MENOPAUSAL STATE: ICD-10-CM

## 2022-01-19 PROCEDURE — 3008F BODY MASS INDEX DOCD: CPT | Mod: CPTII,S$GLB,, | Performed by: OBSTETRICS & GYNECOLOGY

## 2022-01-19 PROCEDURE — 3079F DIAST BP 80-89 MM HG: CPT | Mod: CPTII,S$GLB,, | Performed by: OBSTETRICS & GYNECOLOGY

## 2022-01-19 PROCEDURE — 99214 OFFICE O/P EST MOD 30 MIN: CPT | Mod: 25,S$GLB,, | Performed by: OBSTETRICS & GYNECOLOGY

## 2022-01-19 PROCEDURE — 99396 PR PREVENTIVE VISIT,EST,40-64: ICD-10-PCS | Mod: S$GLB,,, | Performed by: OBSTETRICS & GYNECOLOGY

## 2022-01-19 PROCEDURE — 3074F PR MOST RECENT SYSTOLIC BLOOD PRESSURE < 130 MM HG: ICD-10-PCS | Mod: CPTII,S$GLB,, | Performed by: OBSTETRICS & GYNECOLOGY

## 2022-01-19 PROCEDURE — 3074F SYST BP LT 130 MM HG: CPT | Mod: CPTII,S$GLB,, | Performed by: OBSTETRICS & GYNECOLOGY

## 2022-01-19 PROCEDURE — 1159F PR MEDICATION LIST DOCUMENTED IN MEDICAL RECORD: ICD-10-PCS | Mod: CPTII,S$GLB,, | Performed by: OBSTETRICS & GYNECOLOGY

## 2022-01-19 PROCEDURE — 1159F MED LIST DOCD IN RCRD: CPT | Mod: CPTII,S$GLB,, | Performed by: OBSTETRICS & GYNECOLOGY

## 2022-01-19 PROCEDURE — 99214 PR OFFICE/OUTPT VISIT, EST, LEVL IV, 30-39 MIN: ICD-10-PCS | Mod: 25,S$GLB,, | Performed by: OBSTETRICS & GYNECOLOGY

## 2022-01-19 PROCEDURE — 3079F PR MOST RECENT DIASTOLIC BLOOD PRESSURE 80-89 MM HG: ICD-10-PCS | Mod: CPTII,S$GLB,, | Performed by: OBSTETRICS & GYNECOLOGY

## 2022-01-19 PROCEDURE — 3008F PR BODY MASS INDEX (BMI) DOCUMENTED: ICD-10-PCS | Mod: CPTII,S$GLB,, | Performed by: OBSTETRICS & GYNECOLOGY

## 2022-01-19 PROCEDURE — 99396 PREV VISIT EST AGE 40-64: CPT | Mod: S$GLB,,, | Performed by: OBSTETRICS & GYNECOLOGY

## 2022-01-19 RX ORDER — MISOPROSTOL 200 UG/1
TABLET ORAL
COMMUNITY
Start: 2021-12-28 | End: 2022-01-19

## 2022-01-19 RX ORDER — ASCORBIC ACID 500 MG
TABLET ORAL
COMMUNITY

## 2022-01-19 RX ORDER — TALC
POWDER (GRAM) TOPICAL
COMMUNITY

## 2022-01-19 RX ORDER — ASCORBIC ACID 125 MG
TABLET,CHEWABLE ORAL
COMMUNITY

## 2022-01-19 NOTE — PROGRESS NOTES
WELL WOMAN (menopausal since )  Patient Care Team:  Al Dimas MD as PCP - General (Internal Medicine)  Mandy Ureña MD (Endocrinology)  tony dewitt as Gynecology Oncology    The patient's last visit with me was on 2020 for wwe    HPI:  Patient is a 61 y.o. who presents for her well woman exam today.  History reviewed with patient.   Patient also has additional concerns she wants to discuss at this visit (see additional problem note below)    HRT:  never used    REVIEW OF PRIOR DATA/ HEALTH MAINTENANCE:  LAST ANNUAL/ PAP:   2020   LAST LABS- in the last few months with pcp   LAST MMG (screening)-  normal at [facility]   LAST COLONOSCOPY- - polyps- Q 3 yrs- GI at [facility]        Past Medical History:   Diagnosis Date    Abnormal brain MRI     Abnormal EEG     Abnormal mammogram     [facility] evaluated- normal since    Ankle fracture     Anxiety and depression     Arm fracture     right    Asthma     Diverticulosis     Eczema     Herpes zoster without complications     History of abnormal cervical Pap smear     cryo - normal after    History of Nissen fundoplication     HNPCC (hereditary nonpolyposis colorectal cancer) syndrome     +PMS2 mutation- followed at [facility]; also seeing gyn onc to consider emb vs hyst but ovary adhesed to colon    Hypothyroid     Inguinal hernia     Kidney stones     Low vitamin D level     Menopausal state     Migraines     Seborrhea     Spinal stenosis      SURGICAL HX:   has a past surgical history that includes Cervix lesion destruction (); Cholecystectomy; Open reduction and internal fixation (ORIF) of fracture of lower leg; arm fracture; Nissen fundoplication; Colonoscopy; and uterian biopsy.    SOCIAL HX:    reports that she has never smoked. She has never used smokeless tobacco. She reports current alcohol use. She reports that she does not use drugs.    FAMILY HX:   family  history includes Basal cell carcinoma in her sister; Lung cancer (age of onset: 58) in her mother; Uterine cancer (age of onset: 39) in her sister. .    ALLERGIES:  Erythromycin and Iodine and iodide containing products    Current Outpatient Medications:     ascorbic acid, vitamin C, (VITAMIN C) 100 MG tablet, Take 100 mg by mouth once daily., Disp: , Rfl:     ascorbic acid, vitamin C, (VITAMIN C) 500 MG tablet, , Disp: , Rfl:     aspirin (ECOTRIN) 81 MG EC tablet, Take 81 mg by mouth once daily., Disp: , Rfl:     cetirizine (ZYRTEC) 10 MG tablet, Take 10 mg by mouth once daily., Disp: , Rfl:     cyanocobalamin (VITAMIN B-12) 100 MCG tablet, Take 100 mcg by mouth once daily., Disp: , Rfl:     famotidine (PEPCID) 20 MG tablet, Take 20 mg by mouth 2 (two) times daily., Disp: , Rfl:     magnesium 30 mg Tab, Take by mouth once., Disp: , Rfl:     melatonin (MELATIN) 3 mg tablet, , Disp: , Rfl:     montelukast (SINGULAIR) 10 mg tablet, TAKE 1 TABLET BY MOUTH ONCE DAILY IN THE EVENING, Disp: 90 tablet, Rfl: 3    PROAIR HFA 90 mcg/actuation inhaler, INHALE 2 PUFFS BY MOUTH INTO THE LUNGS EVERY 6 HOURS AS NEEDED FOR WHEEZING. RESCUE, Disp: 18 g, Rfl: 0    SYNTHROID 100 mcg tablet, TAKE 1 TABLET BY MOUTH BEFORE BREAKFAST, Disp: 90 tablet, Rfl: 0    vitamin D (VITAMIN D3) 1000 units Tab, Take 1,000 Units by mouth once daily., Disp: , Rfl:     vitamin E 100 UNIT capsule, Take 100 Units by mouth once daily., Disp: , Rfl:     vitamin K2 100 mcg Cap, , Disp: , Rfl:     zinc gluconate 50 mg tablet, Take 50 mg by mouth once daily., Disp: , Rfl:     valACYclovir (VALTREX) 500 MG tablet, Take 1 tablet (500 mg total) by mouth 3 (three) times daily., Disp: 21 tablet, Rfl: 0    ROS:  CONST:  No fever, chills, fatigue or unexpected changes in weight  CV: No chest pain or palpitations  RESP:  No shortness of breath or cough  GI: No abd pain, vomiting, diarrhea, blood in stool, or changes in bowel mvmts  SKIN: No rashes or  "lesions  MUSCULOSKELETAL: No joint swelling or pain  PSYCH: No changes in mood or insomia  BREASTS: + left axilla and upper left breast tender- no nipple dc  :  No dysuria, urgency, frequency, hematuria or incontinence          No vag dc, itching, odor or dryness          No pelvic pain, dyspareunia, or abnormal vaginal bleeding    VITALS:  Blood pressure 120/81, pulse (!) 58, height 5' 5" (1.651 m), weight 87.1 kg (192 lb).  Body mass index is 31.95 kg/m².     Physical Exam:   Constitutional: She is oriented to person, place, and time. She is cooperative. No distress.      Neck: No thyroid mass present.    Cardiovascular: Normal rate.     Pulmonary/Chest: Effort normal. No respiratory distress. She exhibits no deformity. Right breast exhibits no mass, no nipple discharge, no skin change and no tenderness. Left breast exhibits mass and tenderness. Left breast exhibits no nipple discharge and no skin change. Breasts are symmetrical.   Areas of tenderness- both areas dense            Abdominal: Soft. She exhibits no distension and no mass. There is no abdominal tenderness. There is no guarding. No hernia.     Genitourinary:    Uterus and rectum normal.      Pelvic exam was performed with patient supine.   The external female genitalia was normal.   No external genitalia lesions identified,There is no lesion on the right labia. There is no lesion on the left labia. Cervix is normal. Right adnexum displays no mass and no tenderness. Left adnexum displays no mass and no tenderness. No erythema,  no vaginal discharge or tenderness in the vagina. Normal urethral meatus.Bladder findings: no bladder tenderness   Genitourinary Comments:                Musculoskeletal: Moves all extremeties.      Lymphadenopathy: No inguinal adenopathy noted on the right or left side.    Neurological: She is alert and oriented to person, place, and time.    Skin: Skin is warm and dry. No rash noted.    Psychiatric: Her speech is normal. Mood " and affect normal.     *female chaperone present for entire exam    ASSESSMENT and PLAN:  Encounter for well woman exam with routine gynecological exam  -     Liquid-based pap smear, screening    Breast cancer screening by mammogram  -     Cancel: Mammo Digital Screening Bilat w/ Ashok; Future; Expected date: 02/02/2022    Menopausal state    FOLLOWUP:  1 year for wwe or sooner prn    COUNSELING:  Patient was counseled today on recommendation for yearly pelvic exam, current Pap guidelines, self breast exams, annual screening mammograms, routine screening colonoscopy , and bone density testing.  Discussed vitamin D and calcium supplements as well as weight bearing exercise to decrease risks. Encouraged patient to see her PCP for other health maintenance.     PROBLEM VISIT:  Problem HPI/ROS:              left axilla had a small lump and us neg 2 yrs ago but now bigger and tender, also tender over upper left breast   Having hyst bso in spring at [facility] and will address adhesions to bowel and hernia there     Problem PHYS EXAM:  (pertinent findings noted in PHYS EXAM above)     Problem ASSESMENT and PLAN:  HNPCC (hereditary nonpolyposis colorectal cancer) syndrome    Axillary mass, left  -     US Breast Left Complete; Future; Expected date: 01/26/2022  -     US Axilla Only (Breast Imaging) Left; Future; Expected date: 01/19/2022  -     Mammo Digital Diagnostic Bilat with Ashok; Future; Expected date: 01/26/2022    Breast pain    *Total time spent: 40 min. 50 % or more was spent on counseling about diagnosis, treatment options, and coordination of care.

## 2022-02-18 ENCOUNTER — TELEPHONE (OUTPATIENT)
Dept: OBSTETRICS AND GYNECOLOGY | Facility: CLINIC | Age: 62
End: 2022-02-18
Payer: COMMERCIAL

## 2022-02-18 NOTE — TELEPHONE ENCOUNTER
----- Message from Elba Weston MD sent at 1/19/2022 10:56 AM CST -----  Regarding: bilat diag left axilla, breast us

## 2022-03-07 ENCOUNTER — TELEPHONE (OUTPATIENT)
Dept: OBSTETRICS AND GYNECOLOGY | Facility: CLINIC | Age: 62
End: 2022-03-07
Payer: COMMERCIAL

## 2022-03-07 NOTE — TELEPHONE ENCOUNTER
Please call pt and see if she is still planning to do her diagnostic mmg and axillary us. We ordered for her in jan and have a message from breast center saying she would call them back to schedule

## 2022-03-15 ENCOUNTER — TELEPHONE (OUTPATIENT)
Dept: OBSTETRICS AND GYNECOLOGY | Facility: CLINIC | Age: 62
End: 2022-03-15
Payer: COMMERCIAL

## 2022-03-15 NOTE — TELEPHONE ENCOUNTER
----- Message from Lata Sandy sent at 3/15/2022  3:54 PM CDT -----  Contact: self  Returning the call about scheduling her mammogram. Please call back at [phone]

## 2022-03-15 NOTE — TELEPHONE ENCOUNTER
Spoke with patient. Two pt identifiers confirmed. assisted patient on scheduling breast imaging. Patient verbalized understanding.

## 2022-03-23 ENCOUNTER — TELEPHONE (OUTPATIENT)
Dept: OBSTETRICS AND GYNECOLOGY | Facility: CLINIC | Age: 62
End: 2022-03-23
Payer: COMMERCIAL

## 2022-03-23 NOTE — TELEPHONE ENCOUNTER
----- Message from Sukhdeep Adams sent at 3/23/2022  9:10 AM CDT -----  Contact: Patient  Please call patient regarding a charge for her wellness visit      Call back #  [phone]

## 2022-03-23 NOTE — TELEPHONE ENCOUNTER
Spoke with patient. Two pt identifiers confirmed. Notified by patient she was charged for her wellness visit. In her notes she also had a problem visit. Tried to explain  to her that dx codes generate the charges and offered her the number to call [facility] about the billing. She was not happy about it. .Patient verbalized understanding.

## 2022-03-23 NOTE — TELEPHONE ENCOUNTER
Please pull patients paper chart as she is not yet in epic so we check last visit and get more on her history

## 2022-03-23 NOTE — TELEPHONE ENCOUNTER
Please tell her unfortunately billing is no longer something we have much control over. If anything else is addressed that requires additional orders/workup, it is no longer just her routine wellness. If I dont code it correctly, it just comes back to me to correct. The only other option we have is to not address any other concerns at wellness and have her come back for a separate appt on a different day for the problem.  It is the same codes (wellness one day, problem on another) as the codes she has for doing both on same day and the problem part of that either goes to a copay or her deductible (which is dependent on her insurance plan).  Vast majority of patients find it inconvenient and it delays care of the problem if we make them come back a different day, especially when the billing codes and therefore any additional charge is identical.  I unfortunately do not make these billing/coding rules and both myself and the facility has to code correctly based on those criteria or it is insurance fraud

## 2022-04-01 ENCOUNTER — OFFICE VISIT (OUTPATIENT)
Dept: PRIMARY CARE CLINIC | Facility: CLINIC | Age: 62
End: 2022-04-01
Payer: COMMERCIAL

## 2022-04-01 VITALS
WEIGHT: 186 LBS | HEART RATE: 83 BPM | HEIGHT: 66 IN | DIASTOLIC BLOOD PRESSURE: 71 MMHG | OXYGEN SATURATION: 98 % | SYSTOLIC BLOOD PRESSURE: 108 MMHG | BODY MASS INDEX: 29.89 KG/M2
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
## 2022-04-01 DIAGNOSIS — T14.8XXA BRUISE: ICD-10-CM

## 2022-04-01 DIAGNOSIS — E66.9 OBESITY (BMI 30.0-34.9): ICD-10-CM

## 2022-04-01 PROCEDURE — 3008F PR BODY MASS INDEX (BMI) DOCUMENTED: ICD-10-PCS | Mod: CPTII,S$GLB,, | Performed by: INTERNAL MEDICINE

## 2022-04-01 PROCEDURE — 1159F PR MEDICATION LIST DOCUMENTED IN MEDICAL RECORD: ICD-10-PCS | Mod: CPTII,S$GLB,, | Performed by: INTERNAL MEDICINE

## 2022-04-01 PROCEDURE — 3078F PR MOST RECENT DIASTOLIC BLOOD PRESSURE < 80 MM HG: ICD-10-PCS | Mod: CPTII,S$GLB,, | Performed by: INTERNAL MEDICINE

## 2022-04-01 PROCEDURE — 1159F MED LIST DOCD IN RCRD: CPT | Mod: CPTII,S$GLB,, | Performed by: INTERNAL MEDICINE

## 2022-04-01 PROCEDURE — 3078F DIAST BP <80 MM HG: CPT | Mod: CPTII,S$GLB,, | Performed by: INTERNAL MEDICINE

## 2022-04-01 PROCEDURE — 3074F SYST BP LT 130 MM HG: CPT | Mod: CPTII,S$GLB,, | Performed by: INTERNAL MEDICINE

## 2022-04-01 PROCEDURE — 99214 OFFICE O/P EST MOD 30 MIN: CPT | Mod: S$GLB,,, | Performed by: INTERNAL MEDICINE

## 2022-04-01 PROCEDURE — 99214 PR OFFICE/OUTPT VISIT, EST, LEVL IV, 30-39 MIN: ICD-10-PCS | Mod: S$GLB,,, | Performed by: INTERNAL MEDICINE

## 2022-04-01 PROCEDURE — 3074F PR MOST RECENT SYSTOLIC BLOOD PRESSURE < 130 MM HG: ICD-10-PCS | Mod: CPTII,S$GLB,, | Performed by: INTERNAL MEDICINE

## 2022-04-01 PROCEDURE — 3008F BODY MASS INDEX DOCD: CPT | Mod: CPTII,S$GLB,, | Performed by: INTERNAL MEDICINE

## 2022-04-01 NOTE — PROGRESS NOTES
Subjective:      Patient ID: Crystal Quintero is a 61 y.o. female.    Chief Complaint: Fall (Fell backwards, hitting her back on the toilet tank the night of the hysterectomy.   Had a hysterectomy on 03/25/22 at [facility]. On her lower left back side, she feels a lot of pain. Has a big bruise. She can feel a lump and pain with laying or pressing on the area.  No pain with sitting. )    HPI     As above. After her surgery she returned home and states when she was in the bathroom she bent down to  something and when she stood up suddenly she felt lightheaded and fell back. No loss of consciousness and states she did not injure her head    Review of Systems   Constitutional: Negative for chills and fever.   Respiratory: Negative for cough, shortness of breath and wheezing.    Cardiovascular: Negative for chest pain, palpitations and leg swelling.   Gastrointestinal: Negative for abdominal pain, constipation, diarrhea, nausea and vomiting.   Genitourinary: Negative for dysuria, frequency, hematuria and urgency.   Musculoskeletal: Positive for falls and myalgias.   Skin: Negative for rash.   Neurological: Negative for dizziness and headaches.   Endo/Heme/Allergies:        Bruise from fall     Objective:     Physical Exam  Constitutional:       Appearance: Normal appearance. She is obese.   HENT:      Head: Normocephalic.   Eyes:      Extraocular Movements: Extraocular movements intact.      Conjunctiva/sclera: Conjunctivae normal.      Pupils: Pupils are equal, round, and reactive to light.   Pulmonary:      Comments: tenderness around bruise area, lower lumbar area  Chest:      Chest wall: Tenderness present.   Musculoskeletal:         General: Normal range of motion.      Right lower leg: No edema.      Left lower leg: No edema.   Skin:     General: Skin is warm.      Capillary Refill: Capillary refill takes less than 2 seconds.      Findings: Bruising present.      Comments: Lower left ribcage area, posterior  "  Neurological:      General: No focal deficit present.      Mental Status: She is alert and oriented to person, place, and time.   Psychiatric:         Mood and Affect: Mood normal.        /71 (BP Location: Left arm, Patient Position: Sitting, BP Method: Medium (Automatic))   Pulse 83   Ht 5' 6" (1.676 m)   Wt 84.4 kg (186 lb)   LMP  (LMP Unknown)   SpO2 98%   BMI 30.02 kg/m²   Assessment:       ICD-10-CM ICD-9-CM   1. Fall, sequela  W19.XXXS 909.4     E929.3   2. Bruise  T14.8XXA 924.9   3. Obesity (BMI 30.0-34.9)  E66.9 278.00       Plan:     Medication List with Changes/Refills   Current Medications    ASCORBIC ACID, VITAMIN C, (VITAMIN C) 100 MG TABLET    Take 100 mg by mouth once daily.    ASCORBIC ACID, VITAMIN C, (VITAMIN C) 500 MG TABLET        ASPIRIN (ECOTRIN) 81 MG EC TABLET    Take 81 mg by mouth once daily.    CETIRIZINE (ZYRTEC) 10 MG TABLET    Take 10 mg by mouth once daily.    CYANOCOBALAMIN (VITAMIN B-12) 100 MCG TABLET    Take 100 mcg by mouth once daily.    FAMOTIDINE (PEPCID) 20 MG TABLET    Take 20 mg by mouth 2 (two) times daily.    MAGNESIUM 30 MG TAB    Take by mouth once.    MELATONIN (MELATIN) 3 MG TABLET        MONTELUKAST (SINGULAIR) 10 MG TABLET    TAKE 1 TABLET BY MOUTH ONCE DAILY IN THE EVENING    PROAIR HFA 90 MCG/ACTUATION INHALER    INHALE 2 PUFFS BY MOUTH INTO THE LUNGS EVERY 6 HOURS AS NEEDED FOR WHEEZING. RESCUE    SYNTHROID 100 MCG TABLET    TAKE 1 TABLET BY MOUTH BEFORE BREAKFAST    VALACYCLOVIR (VALTREX) 500 MG TABLET    Take 1 tablet (500 mg total) by mouth 3 (three) times daily.    VITAMIN D (VITAMIN D3) 1000 UNITS TAB    Take 1,000 Units by mouth once daily.    VITAMIN E 100 UNIT CAPSULE    Take 100 Units by mouth once daily.    VITAMIN K2 100 MCG CAP        ZINC GLUCONATE 50 MG TABLET    Take 50 mg by mouth once daily.        Fall, sequela    Bruise    Obesity (BMI 30.0-34.9)         No significant hematuria in urine, some bilirubin noted but cbc and cmp " reviewed and are normal, ketones also seen and recommended she drink plenty of water/fluids. She denies any significant symptoms. She was advised to schedule follow up with PCP      Future Appointments   Date Time Provider Department Center   1/24/2023  1:00 PM Elba Weston [id number]

## 2022-04-08 ENCOUNTER — TELEPHONE (OUTPATIENT)
Dept: OBSTETRICS AND GYNECOLOGY | Facility: CLINIC | Age: 62
End: 2022-04-08
Payer: COMMERCIAL

## 2022-04-08 NOTE — TELEPHONE ENCOUNTER
Please call pt again concerning her getting her breast and axillary ultrasounds. 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 Please see if she has the breast/axilla imaging scheduled here or if she prefers she can potentially also do it there at [facility]. Just see what her plans are and let me know

## 2022-04-11 ENCOUNTER — TELEPHONE (OUTPATIENT)
Dept: OBSTETRICS AND GYNECOLOGY | Facility: CLINIC | Age: 62
End: 2022-04-11

## 2022-04-11 NOTE — TELEPHONE ENCOUNTER
Spoke with patient. She has not scheduled that appointment. She is considering having the ultrasound done at [facility]. She will try to schedule it on the same day as her 6 weeks post-op visit, which is not scheduled yet. She is to call our office back with the appointment date and time once it's scheduled. Patient verbalized understanding.

## 2022-04-11 NOTE — TELEPHONE ENCOUNTER
She will call back once it's scheduled. She plans on having it done when she goes to [facility] for her 6 weeks post-op visit. She will call our office back with the date and time once it's scheduled.

## 2022-05-18 ENCOUNTER — TELEPHONE (OUTPATIENT)
Dept: OBSTETRICS AND GYNECOLOGY | Facility: CLINIC | Age: 62
End: 2022-05-18
Payer: COMMERCIAL

## 2022-05-18 ENCOUNTER — TELEPHONE (OUTPATIENT)
Dept: PRIMARY CARE CLINIC | Facility: CLINIC | Age: 62
End: 2022-05-18
Payer: COMMERCIAL

## 2022-05-18 DIAGNOSIS — Z13.820 SCREENING FOR OSTEOPOROSIS: Primary | ICD-10-CM

## 2022-05-18 NOTE — TELEPHONE ENCOUNTER
Spoke with patient. Two pt identifiers confirmed. Notified patient  We faxed her order for her bone density scan to [facility]. Patient verbalized understanding.

## 2022-05-18 NOTE — TELEPHONE ENCOUNTER
Pt states that she has eczema on her right toe and after pt stop taking medication the eczema comes back. Pt states that her  is retiring next month and they will lose their insurance so the pt states that she need to be seen before June 1                        ----- Message from Roslyn Dial MA sent at 5/18/2022 11:14 AM CDT -----  Contact: PT    ----- Message -----  From: Shelly Orourke  Sent: 5/18/2022  11:07 AM CDT  To: [facility] Staff    .Type:  Sooner Appointment Request    Caller is requesting a sooner appointment.  Caller declined first available appointment listed below.  Caller will not accept being placed on the waitlist and is requesting a message be sent to doctor.  Name of Caller: Crystal    When is the first available appointment 07/07/2022   Symptoms: eczema on right toe     Would the patient rather a call back or a response via [facility]?  Callback   Best Call Back Number: [phone]        Additional Information:

## 2022-05-18 NOTE — TELEPHONE ENCOUNTER
----- Message from Sukhdeep Adams sent at 5/18/2022 10:40 AM CDT -----  Contact: Patient  Patient need a order sent  for her bone density test    Please call the patient and advise when the order has been sent      Call back #  [phone]

## 2022-05-25 ENCOUNTER — TELEPHONE (OUTPATIENT)
Dept: PRIMARY CARE CLINIC | Facility: CLINIC | Age: 62
End: 2022-05-25

## 2022-05-25 ENCOUNTER — OFFICE VISIT (OUTPATIENT)
Dept: PRIMARY CARE CLINIC | Facility: CLINIC | Age: 62
End: 2022-05-25
Payer: COMMERCIAL

## 2022-05-25 VITALS
SYSTOLIC BLOOD PRESSURE: 119 MMHG | HEIGHT: 65 IN | OXYGEN SATURATION: 97 % | BODY MASS INDEX: 31.52 KG/M2 | WEIGHT: 189.19 LBS | DIASTOLIC BLOOD PRESSURE: 78 MMHG | HEART RATE: 59 BPM

## 2022-05-25 DIAGNOSIS — E03.9 HYPOTHYROIDISM, UNSPECIFIED TYPE: ICD-10-CM

## 2022-05-25 DIAGNOSIS — R73.03 PRE-DIABETES: ICD-10-CM

## 2022-05-25 DIAGNOSIS — Z00.00 ENCOUNTER FOR ANNUAL HEALTH EXAMINATION: ICD-10-CM

## 2022-05-25 DIAGNOSIS — J45.909 ASTHMA, UNSPECIFIED ASTHMA SEVERITY, UNSPECIFIED WHETHER COMPLICATED, UNSPECIFIED WHETHER PERSISTENT: ICD-10-CM

## 2022-05-25 DIAGNOSIS — L30.9 ECZEMA, UNSPECIFIED TYPE: Primary | ICD-10-CM

## 2022-05-25 PROCEDURE — 3008F BODY MASS INDEX DOCD: CPT | Mod: CPTII,S$GLB,, | Performed by: INTERNAL MEDICINE

## 2022-05-25 PROCEDURE — 3074F SYST BP LT 130 MM HG: CPT | Mod: CPTII,S$GLB,, | Performed by: INTERNAL MEDICINE

## 2022-05-25 PROCEDURE — 3008F PR BODY MASS INDEX (BMI) DOCUMENTED: ICD-10-PCS | Mod: CPTII,S$GLB,, | Performed by: INTERNAL MEDICINE

## 2022-05-25 PROCEDURE — 99214 OFFICE O/P EST MOD 30 MIN: CPT | Mod: S$GLB,,, | Performed by: INTERNAL MEDICINE

## 2022-05-25 PROCEDURE — 3078F PR MOST RECENT DIASTOLIC BLOOD PRESSURE < 80 MM HG: ICD-10-PCS | Mod: CPTII,S$GLB,, | Performed by: INTERNAL MEDICINE

## 2022-05-25 PROCEDURE — 3044F HG A1C LEVEL LT 7.0%: CPT | Mod: CPTII,S$GLB,, | Performed by: INTERNAL MEDICINE

## 2022-05-25 PROCEDURE — 3044F PR MOST RECENT HEMOGLOBIN A1C LEVEL <7.0%: ICD-10-PCS | Mod: CPTII,S$GLB,, | Performed by: INTERNAL MEDICINE

## 2022-05-25 PROCEDURE — 99214 PR OFFICE/OUTPT VISIT, EST, LEVL IV, 30-39 MIN: ICD-10-PCS | Mod: S$GLB,,, | Performed by: INTERNAL MEDICINE

## 2022-05-25 PROCEDURE — 3074F PR MOST RECENT SYSTOLIC BLOOD PRESSURE < 130 MM HG: ICD-10-PCS | Mod: CPTII,S$GLB,, | Performed by: INTERNAL MEDICINE

## 2022-05-25 PROCEDURE — 3078F DIAST BP <80 MM HG: CPT | Mod: CPTII,S$GLB,, | Performed by: INTERNAL MEDICINE

## 2022-05-25 RX ORDER — CRISABOROLE 20 MG/G
1 OINTMENT TOPICAL 2 TIMES DAILY
Qty: 100 G | Refills: 2 | Status: SHIPPED | OUTPATIENT
Start: 2022-05-25

## 2022-05-25 RX ORDER — ALBUTEROL SULFATE 90 UG/1
2 AEROSOL, METERED RESPIRATORY (INHALATION) EVERY 6 HOURS PRN
Qty: 18 G | Refills: 0 | Status: SHIPPED | OUTPATIENT
Start: 2022-05-25 | End: 2023-05-25

## 2022-05-25 RX ORDER — EPINEPHRINE 0.3 MG/.3ML
1 INJECTION SUBCUTANEOUS ONCE
Qty: 0.3 ML | Refills: 0 | Status: SHIPPED | OUTPATIENT
Start: 2022-05-25 | End: 2022-05-25

## 2022-05-25 NOTE — TELEPHONE ENCOUNTER
Advised ok for #90.     ----- Message from Devorah Daugherty sent at 5/25/2022 11:13 AM CDT -----  [facility] pharmacy calling regarding prescription   States only one inhaler was called out, wanting to know if doctor is ok with giving patient 90 day supply(3 inhalers). Please call back at [phone]

## 2022-05-25 NOTE — TELEPHONE ENCOUNTER
Could not pull up in their system. So they want to clarify exactly what you are wanting.     ----- Message from Lata Sandy sent at 5/25/2022 10:10 AM CDT -----  Contact: Jacqui @ [facility]  Requesting a call back regarding clarification on topical packet. Please call back at [phone]

## 2022-05-25 NOTE — PROGRESS NOTES
"Subjective:      Patient ID: Crystal Quintero is a 61 y.o. female.    Chief Complaint: Recurrent Skin Infections (Today it is on B?L thumbs and index fingers. Split open and skin gets tough. She states it is painful and cannot use her thumbs much. Has been going on for years. ), ECZEMA (A COUPLE OF years and is now on the top of her right foot. Has been having since the age of 13. She had it on the left foot but it left. Tried OTC vaseline and eczema creams. Script she states has used that has helped but "makes my skin thin." ), Results (Would like bone density results. ), and Tinnitus    HPI     Past Medical History:   Diagnosis Date    Abnormal brain MRI 2010    Abnormal EEG 2010    Abnormal mammogram 2014    [facility] evaluated- normal since    Ankle fracture     Anxiety and depression     Arm fracture     right    Asthma     Diverticulosis     Eczema     Herpes zoster without complications     History of abnormal cervical Pap smear     cryo 2008- normal after    History of Nissen fundoplication     HNPCC (hereditary nonpolyposis colorectal cancer) syndrome     +PMS2 mutation- followed at [facility]; also seeing gyn onc to consider emb vs hyst but ovary adhesed to colon    Hypothyroid     Inguinal hernia     Kidney stones     Low vitamin D level     Menopausal state     Migraines     Seborrhea     Spinal stenosis        Patient here for follow up. She had a CT scan after her fall, she was evaluated by me but she states the pain persisted so she went to [place] and had a CT done which showed no changes related to trauma. Her bone density showed osteopenia  Here for rash on her thumbs and feet, she has a h/o eczema and has used steroid creams in the past but has created skin thinning  She states her insurance may change soon     Review of Systems   Constitutional: Negative for chills, fever and weight loss.   HENT: Positive for tinnitus. Negative for congestion, ear discharge, ear pain and " "hearing loss.    Respiratory: Negative for cough, shortness of breath and wheezing.    Cardiovascular: Negative for chest pain, palpitations and leg swelling.   Gastrointestinal: Negative for abdominal pain, diarrhea, nausea and vomiting.   Genitourinary: Negative for dysuria, frequency and urgency.   Musculoskeletal: Negative for falls.   Skin: Positive for itching and rash.   Neurological: Negative for dizziness and headaches.   Psychiatric/Behavioral: Negative for depression, substance abuse and suicidal ideas.     Objective:     Physical Exam  Vitals reviewed.   HENT:      Head: Normocephalic.   Eyes:      Extraocular Movements: Extraocular movements intact.      Conjunctiva/sclera: Conjunctivae normal.      Pupils: Pupils are equal, round, and reactive to light.   Cardiovascular:      Rate and Rhythm: Normal rate and regular rhythm.   Pulmonary:      Effort: Pulmonary effort is normal.      Breath sounds: Normal breath sounds.   Abdominal:      General: Bowel sounds are normal.   Musculoskeletal:         General: Normal range of motion.   Skin:     General: Skin is warm.      Capillary Refill: Capillary refill takes less than 2 seconds.      Findings: Rash present. No bruising.   Neurological:      General: No focal deficit present.      Mental Status: She is alert.   Psychiatric:         Mood and Affect: Mood normal.        /78 (BP Location: Left arm, Patient Position: Sitting, BP Method: Medium (Automatic))   Pulse (!) 59   Ht 5' 5" (1.651 m)   Wt 85.8 kg (189 lb 3.2 oz)   LMP  (LMP Unknown)   SpO2 97%   BMI 31.48 kg/m²     Assessment:       ICD-10-CM ICD-9-CM   1. Eczema, unspecified type  L30.9 692.9   2. Asthma, unspecified asthma severity, unspecified whether complicated, unspecified whether persistent  J45.909 493.90   3. Encounter for annual health examination  Z00.00 V70.0   4. Hypothyroidism, unspecified type  E03.9 244.9   5. Pre-diabetes  R73.03 790.29       Plan:     Medication List " with Changes/Refills   New Medications    ALBUTEROL (VENTOLIN HFA) 90 MCG/ACTUATION INHALER    Inhale 2 puffs into the lungs every 6 (six) hours as needed for Wheezing. Rescue    CALCIUM ACETATE-ALUMINUM SULF 51-49% 51-49 % PACKET    Apply 1 packet topically 3 (three) times daily.    CRISABOROLE (EUCRISA) 2 % OINT    Apply 1 inch topically 2 (two) times daily.    EPINEPHRINE (EPIPEN) 0.3 MG/0.3 ML ATIN    Inject 0.3 mLs (0.3 mg total) into the muscle once. for 1 dose   Current Medications    ASCORBIC ACID, VITAMIN C, (VITAMIN C) 100 MG TABLET    Take 100 mg by mouth once daily.    ASCORBIC ACID, VITAMIN C, (VITAMIN C) 500 MG TABLET        ASPIRIN (ECOTRIN) 81 MG EC TABLET    Take 81 mg by mouth once daily.    CETIRIZINE (ZYRTEC) 10 MG TABLET    Take 10 mg by mouth once daily.    CYANOCOBALAMIN (VITAMIN B-12) 100 MCG TABLET    Take 100 mcg by mouth once daily.    FAMOTIDINE (PEPCID) 20 MG TABLET    Take 20 mg by mouth 2 (two) times daily.    MAGNESIUM 30 MG TAB    Take by mouth once.    MELATONIN (MELATIN) 3 MG TABLET        MONTELUKAST (SINGULAIR) 10 MG TABLET    TAKE 1 TABLET BY MOUTH ONCE DAILY IN THE EVENING    SYNTHROID 100 MCG TABLET    TAKE 1 TABLET BY MOUTH BEFORE BREAKFAST    VALACYCLOVIR (VALTREX) 500 MG TABLET    Take 1 tablet (500 mg total) by mouth 3 (three) times daily.    VITAMIN D (VITAMIN D3) 1000 UNITS TAB    Take 1,000 Units by mouth once daily.    VITAMIN E 100 UNIT CAPSULE    Take 100 Units by mouth once daily.    VITAMIN K2 100 MCG CAP        ZINC GLUCONATE 50 MG TABLET    Take 50 mg by mouth once daily.   Discontinued Medications    PROAIR HFA 90 MCG/ACTUATION INHALER    INHALE 2 PUFFS BY MOUTH INTO THE LUNGS EVERY 6 HOURS AS NEEDED FOR WHEEZING. RESCUE        Eczema, unspecified type  -     crisaborole (EUCRISA) 2 % Oint; Apply 1 inch topically 2 (two) times daily.  Dispense: 100 g; Refill: 2  -     calcium acetate-aluminum sulf 51-49% 51-49 % packet; Apply 1 packet topically 3 (three) times  daily.  Dispense: 50 packet; Refill: 3  -     EPINEPHrine (EPIPEN) 0.3 mg/0.3 mL AtIn; Inject 0.3 mLs (0.3 mg total) into the muscle once. for 1 dose  Dispense: 0.3 mL; Refill: 0    Asthma, unspecified asthma severity, unspecified whether complicated, unspecified whether persistent  -     albuterol (VENTOLIN HFA) 90 mcg/actuation inhaler; Inhale 2 puffs into the lungs every 6 (six) hours as needed for Wheezing. Rescue  Dispense: 18 g; Refill: 0  -     EPINEPHrine (EPIPEN) 0.3 mg/0.3 mL AtIn; Inject 0.3 mLs (0.3 mg total) into the muscle once. for 1 dose  Dispense: 0.3 mL; Refill: 0    Encounter for annual health examination  -     CBC Auto Differential; Future; Expected date: 05/25/2022  -     Comprehensive Metabolic Panel; Future; Expected date: 05/25/2022  -     Lipid Panel; Future; Expected date: 05/25/2022    Hypothyroidism, unspecified type  -     TSH; Future; Expected date: 05/25/2022  -     T4, Free; Future; Expected date: 05/25/2022    Pre-diabetes  -     Hemoglobin A1C; Future; Expected date: 05/25/2022         I am unsure about tinnitus cause but it is chronic, she doesn't think she will be able to see an ENT physician before her insurance runs out. I showed her the [place] solution which is otc and if possible will see if she can get it as a prescription.     Future Appointments   Date Time Provider Department Center   1/24/2023  1:00 PM Elba Weston [id number]

## 2022-05-26 LAB
ALBUMIN SERPL-MCNC: 4.2 G/DL (ref 3.6–5.1)
ALBUMIN/GLOB SERPL: 1.4 (CALC) (ref 1–2.5)
ALP SERPL-CCNC: 60 U/L (ref 37–153)
ALT SERPL-CCNC: 28 U/L (ref 6–29)
AST SERPL-CCNC: 27 U/L (ref 10–35)
BASOPHILS # BLD AUTO: 60 CELLS/UL (ref 0–200)
BASOPHILS NFR BLD AUTO: 0.9 %
BILIRUB SERPL-MCNC: 0.5 MG/DL (ref 0.2–1.2)
BUN SERPL-MCNC: 10 MG/DL (ref 7–25)
BUN/CREAT SERPL: ABNORMAL (CALC) (ref 6–22)
CALCIUM SERPL-MCNC: 9.6 MG/DL (ref 8.6–10.4)
CHLORIDE SERPL-SCNC: 101 MMOL/L (ref 98–110)
CHOLEST SERPL-MCNC: 240 MG/DL
CHOLEST/HDLC SERPL: 4.9 (CALC)
CO2 SERPL-SCNC: 28 MMOL/L (ref 20–32)
CREAT SERPL-MCNC: 0.86 MG/DL (ref 0.5–1.05)
EOSINOPHIL # BLD AUTO: 704 CELLS/UL (ref 15–500)
EOSINOPHIL NFR BLD AUTO: 10.5 %
ERYTHROCYTE [DISTWIDTH] IN BLOOD BY AUTOMATED COUNT: 12.8 % (ref 11–15)
GLOBULIN SER CALC-MCNC: 2.9 G/DL (CALC) (ref 1.9–3.7)
GLUCOSE SERPL-MCNC: 112 MG/DL (ref 65–99)
HBA1C MFR BLD: 6.6 % OF TOTAL HGB
HCT VFR BLD AUTO: 42.4 % (ref 35–45)
HDLC SERPL-MCNC: 49 MG/DL
HGB BLD-MCNC: 14.1 G/DL (ref 11.7–15.5)
LDLC SERPL CALC-MCNC: 162 MG/DL (CALC)
LYMPHOCYTES # BLD AUTO: 1755 CELLS/UL (ref 850–3900)
LYMPHOCYTES NFR BLD AUTO: 26.2 %
MCH RBC QN AUTO: 29.4 PG (ref 27–33)
MCHC RBC AUTO-ENTMCNC: 33.3 G/DL (ref 32–36)
MCV RBC AUTO: 88.3 FL (ref 80–100)
MONOCYTES # BLD AUTO: 449 CELLS/UL (ref 200–950)
MONOCYTES NFR BLD AUTO: 6.7 %
NEUTROPHILS # BLD AUTO: 3732 CELLS/UL (ref 1500–7800)
NEUTROPHILS NFR BLD AUTO: 55.7 %
NONHDLC SERPL-MCNC: 191 MG/DL (CALC)
PLATELET # BLD AUTO: 322 THOUSAND/UL (ref 140–400)
PMV BLD REES-ECKER: 9.9 FL (ref 7.5–12.5)
POTASSIUM SERPL-SCNC: 4.7 MMOL/L (ref 3.5–5.3)
PROT SERPL-MCNC: 7.1 G/DL (ref 6.1–8.1)
RBC # BLD AUTO: 4.8 MILLION/UL (ref 3.8–5.1)
SODIUM SERPL-SCNC: 139 MMOL/L (ref 135–146)
T4 FREE SERPL-MCNC: 1.5 NG/DL (ref 0.8–1.8)
TRIGL SERPL-MCNC: 145 MG/DL
TSH SERPL-ACNC: 0.36 MIU/L (ref 0.4–4.5)
WBC # BLD AUTO: 6.7 THOUSAND/UL (ref 3.8–10.8)

## 2023-02-23 ENCOUNTER — PATIENT MESSAGE (OUTPATIENT)
Dept: ADMINISTRATIVE | Facility: HOSPITAL | Age: 63
End: 2023-02-23
Payer: COMMERCIAL

## 2023-05-23 ENCOUNTER — PATIENT OUTREACH (OUTPATIENT)
Dept: ADMINISTRATIVE | Facility: HOSPITAL | Age: 63
End: 2023-05-23
Payer: COMMERCIAL

## 2023-05-23 NOTE — PROGRESS NOTES
[facility] EYE EXAM: per chart review pt is overdue for exam, attempted to contact pt no answer, lvm.

## 2023-05-25 ENCOUNTER — PATIENT MESSAGE (OUTPATIENT)
Dept: ADMINISTRATIVE | Facility: HOSPITAL | Age: 63
End: 2023-05-25
Payer: COMMERCIAL

## 2023-06-21 ENCOUNTER — PATIENT OUTREACH (OUTPATIENT)
Dept: ADMINISTRATIVE | Facility: HOSPITAL | Age: 63
End: 2023-06-21
Payer: COMMERCIAL

## 2024-05-24 ENCOUNTER — OFFICE VISIT (OUTPATIENT)
Dept: PRIMARY CARE CLINIC | Facility: CLINIC | Age: 64
End: 2024-05-24
Payer: COMMERCIAL

## 2024-05-24 VITALS
SYSTOLIC BLOOD PRESSURE: 129 MMHG | BODY MASS INDEX: 32.65 KG/M2 | HEIGHT: 65 IN | OXYGEN SATURATION: 95 % | HEART RATE: 66 BPM | DIASTOLIC BLOOD PRESSURE: 80 MMHG | WEIGHT: 196 LBS

## 2024-05-24 DIAGNOSIS — M54.40 LOW BACK PAIN WITH SCIATICA, SCIATICA LATERALITY UNSPECIFIED, UNSPECIFIED BACK PAIN LATERALITY, UNSPECIFIED CHRONICITY: Primary | ICD-10-CM

## 2024-05-24 DIAGNOSIS — J45.909 ASTHMA, UNSPECIFIED ASTHMA SEVERITY, UNSPECIFIED WHETHER COMPLICATED, UNSPECIFIED WHETHER PERSISTENT: ICD-10-CM

## 2024-05-24 PROCEDURE — 3074F SYST BP LT 130 MM HG: CPT | Mod: CPTII,S$GLB,, | Performed by: INTERNAL MEDICINE

## 2024-05-24 PROCEDURE — 1159F MED LIST DOCD IN RCRD: CPT | Mod: CPTII,S$GLB,, | Performed by: INTERNAL MEDICINE

## 2024-05-24 PROCEDURE — 3008F BODY MASS INDEX DOCD: CPT | Mod: CPTII,S$GLB,, | Performed by: INTERNAL MEDICINE

## 2024-05-24 PROCEDURE — 99213 OFFICE O/P EST LOW 20 MIN: CPT | Mod: S$GLB,,, | Performed by: INTERNAL MEDICINE

## 2024-05-24 PROCEDURE — 3079F DIAST BP 80-89 MM HG: CPT | Mod: CPTII,S$GLB,, | Performed by: INTERNAL MEDICINE

## 2024-05-24 RX ORDER — METHOCARBAMOL 500 MG/1
500 TABLET, FILM COATED ORAL 4 TIMES DAILY
Qty: 40 TABLET | Refills: 0 | Status: SHIPPED | OUTPATIENT
Start: 2024-05-24 | End: 2024-06-03

## 2024-05-24 RX ORDER — ALBUTEROL SULFATE 90 UG/1
2 AEROSOL, METERED RESPIRATORY (INHALATION) EVERY 6 HOURS PRN
Qty: 18 G | Refills: 0 | Status: SHIPPED | OUTPATIENT
Start: 2024-05-24 | End: 2024-06-13 | Stop reason: SDUPTHER

## 2024-05-24 NOTE — PROGRESS NOTES
Subjective:      Patient ID: Crystal Quintero is a 63 y.o. female.    Chief Complaint: Low-back Pain (Through it out putting on her shoes to go to the gym on tues morning. The patient is walking slowly and with help. Only tingling on Wed at B/L feet and it is no longer occurring.  ) and Medication Refill    HPI    Past Medical History:   Diagnosis Date    Abnormal brain MRI 2010    Abnormal EEG 2010    Abnormal mammogram 2014    [facility] evaluated- normal since    Ankle fracture     Anxiety and depression     Arm fracture     right    Asthma     Diverticulosis     Eczema     Herpes zoster without complications     History of abnormal cervical Pap smear     cryo 2008- normal after    History of Nissen fundoplication     HNPCC (hereditary nonpolyposis colorectal cancer) syndrome     +PMS2 mutation- followed at [facility]; also seeing gyn onc to consider emb vs hyst but ovary adhesed to colon    Hypothyroid     Inguinal hernia     Kidney stones     Low vitamin D level     Menopausal state     Migraines     Seborrhea     Spinal stenosis        As above      Review of Systems   Constitutional:  Negative for chills and fever.   HENT:  Negative for hearing loss.    Eyes:  Negative for blurred vision.   Respiratory:  Negative for cough, shortness of breath and wheezing.    Cardiovascular:  Negative for chest pain, palpitations and leg swelling.   Gastrointestinal:  Negative for abdominal pain, blood in stool, constipation, diarrhea, melena, nausea and vomiting.   Genitourinary:  Negative for dysuria, frequency and urgency.   Musculoskeletal:  Positive for back pain and myalgias. Negative for falls.        Reports she is feeling better   Skin:  Negative for rash.   Neurological:  Negative for dizziness, tingling, tremors, sensory change and headaches.   Endo/Heme/Allergies:  Does not bruise/bleed easily.   Psychiatric/Behavioral:  Negative for depression. The patient is not nervous/anxious.      Objective:     Physical  "Exam  Vitals reviewed.   Constitutional:       Appearance: Normal appearance.   HENT:      Head: Normocephalic.      Mouth/Throat:      Mouth: Mucous membranes are moist.      Pharynx: Oropharynx is clear.   Eyes:      Extraocular Movements: Extraocular movements intact.      Conjunctiva/sclera: Conjunctivae normal.      Pupils: Pupils are equal, round, and reactive to light.   Cardiovascular:      Rate and Rhythm: Normal rate and regular rhythm.   Pulmonary:      Effort: Pulmonary effort is normal.      Breath sounds: Normal breath sounds.   Abdominal:      General: Bowel sounds are normal.   Musculoskeletal:      Right lower leg: No edema.      Left lower leg: No edema.      Comments: Difficult for her to get up, can walk some with small steps     Skin:     General: Skin is warm.      Capillary Refill: Capillary refill takes less than 2 seconds.   Neurological:      Mental Status: She is alert and oriented to person, place, and time.   Psychiatric:         Mood and Affect: Mood normal.       /80 (BP Location: Right arm, Patient Position: Sitting, BP Method: Medium (Automatic))   Pulse 66   Ht 5' 5" (1.651 m)   Wt 88.9 kg (196 lb)   LMP  (LMP Unknown)   SpO2 95%   BMI 32.62 kg/m²     Assessment:       ICD-10-CM ICD-9-CM   1. Low back pain with sciatica, sciatica laterality unspecified, unspecified back pain laterality, unspecified chronicity  M54.40 724.3   2. Asthma, unspecified asthma severity, unspecified whether complicated, unspecified whether persistent  J45.909 493.90       Plan:     Medication List with Changes/Refills   New Medications    METHOCARBAMOL (ROBAXIN) 500 MG TAB    Take 1 tablet (500 mg total) by mouth 4 (four) times daily. for 10 days   Current Medications    ASCORBIC ACID, VITAMIN C, (VITAMIN C) 100 MG TABLET    Take 100 mg by mouth once daily.    ASCORBIC ACID, VITAMIN C, (VITAMIN C) 500 MG TABLET        ASPIRIN (ECOTRIN) 81 MG EC TABLET    Take 81 mg by mouth once daily.    " CALCIUM ACETATE-ALUMINUM SULF 51-49% 51-49 % PACKET    Apply 1 packet topically 3 (three) times daily.    CETIRIZINE (ZYRTEC) 10 MG TABLET    Take 10 mg by mouth once daily.    CRISABOROLE (EUCRISA) 2 % OINT    Apply 1 inch topically 2 (two) times daily.    CYANOCOBALAMIN (VITAMIN B-12) 100 MCG TABLET    Take 100 mcg by mouth once daily.    EPINEPHRINE (EPIPEN) 0.3 MG/0.3 ML ATIN    Inject 0.3 mLs (0.3 mg total) into the muscle once. for 1 dose    FAMOTIDINE (PEPCID) 20 MG TABLET    Take 20 mg by mouth 2 (two) times daily.    MAGNESIUM 30 MG TAB    Take by mouth once.    MELATONIN (MELATIN) 3 MG TABLET        MONTELUKAST (SINGULAIR) 10 MG TABLET    TAKE 1 TABLET BY MOUTH ONCE DAILY IN THE EVENING    SYNTHROID 100 MCG TABLET    TAKE 1 TABLET BY MOUTH BEFORE BREAKFAST    VALACYCLOVIR (VALTREX) 500 MG TABLET    Take 1 tablet (500 mg total) by mouth 3 (three) times daily.    VITAMIN D (VITAMIN D3) 1000 UNITS TAB    Take 1,000 Units by mouth once daily.    VITAMIN E 100 UNIT CAPSULE    Take 100 Units by mouth once daily.    VITAMIN K2 100 MCG CAP        ZINC GLUCONATE 50 MG TABLET    Take 50 mg by mouth once daily.   Changed and/or Refilled Medications    Modified Medication Previous Medication    ALBUTEROL (VENTOLIN HFA) 90 MCG/ACTUATION INHALER albuterol (VENTOLIN HFA) 90 mcg/actuation inhaler       Inhale 2 puffs into the lungs every 6 (six) hours as needed for Wheezing. Rescue    Inhale 2 puffs into the lungs every 6 (six) hours as needed for Wheezing. Rescue        1. Low back pain with sciatica, sciatica laterality unspecified, unspecified back pain laterality, unspecified chronicity  -     Ambulatory referral/consult to Physical/Occupational Therapy; Future; Expected date: 05/31/2024  -     methocarbamoL (ROBAXIN) 500 MG Tab; Take 1 tablet (500 mg total) by mouth 4 (four) times daily. for 10 days  Dispense: 40 tablet; Refill: 0  -     X-Ray Lumbar Spine AP And Lateral; Future; Expected date: 05/24/2024    2.  Asthma, unspecified asthma severity, unspecified whether complicated, unspecified whether persistent  -     albuterol (VENTOLIN HFA) 90 mcg/actuation inhaler; Inhale 2 puffs into the lungs every 6 (six) hours as needed for Wheezing. Rescue  Dispense: 18 g; Refill: 0           Future Appointments   Date Time Provider Department Center   6/24/2024  8:40 AM Mag Hickman [id number]

## 2024-06-13 DIAGNOSIS — J45.909 ASTHMA, UNSPECIFIED ASTHMA SEVERITY, UNSPECIFIED WHETHER COMPLICATED, UNSPECIFIED WHETHER PERSISTENT: ICD-10-CM

## 2024-06-13 RX ORDER — ALBUTEROL SULFATE 90 UG/1
2 AEROSOL, METERED RESPIRATORY (INHALATION) EVERY 6 HOURS PRN
Qty: 18 G | Refills: 0 | Status: CANCELLED | OUTPATIENT
Start: 2024-06-13 | End: 2025-06-13

## 2024-06-13 NOTE — TELEPHONE ENCOUNTER
----- Message from Amy Forman sent at 6/13/2024  2:44 PM CDT -----  Contact: self  Type:  RX Refill Request    Who Called: Crystal Quintero  Refill or New Rx:refill  RX Name and Strength:albuterol (VENTOLIN HFA) 90 mcg/actuation inhaler    Preferred Pharmacy with phone number:  [facility] [address] [address]  Phone: [phone] Fax: [phone]    Local or Mail Order:local  Ordering Provider:Mag  Would the patient rather a call back or a response via [facility]? Call back  Best Call Back Number:[phone]  Additional Information: pt wants a call back

## 2024-06-14 RX ORDER — ALBUTEROL SULFATE 90 UG/1
2 AEROSOL, METERED RESPIRATORY (INHALATION) EVERY 6 HOURS PRN
Qty: 18 G | Refills: 0 | Status: SHIPPED | OUTPATIENT
Start: 2024-06-14 | End: 2025-06-14

## 2024-06-19 ENCOUNTER — TELEPHONE (OUTPATIENT)
Dept: FAMILY MEDICINE | Facility: CLINIC | Age: 64
End: 2024-06-19
Payer: COMMERCIAL

## 2024-09-06 ENCOUNTER — PATIENT MESSAGE (OUTPATIENT)
Dept: PRIMARY CARE CLINIC | Facility: CLINIC | Age: 64
End: 2024-09-06
Payer: COMMERCIAL

## 2024-10-21 ENCOUNTER — OFFICE VISIT (OUTPATIENT)
Dept: OBSTETRICS AND GYNECOLOGY | Facility: CLINIC | Age: 64
End: 2024-10-21
Payer: COMMERCIAL

## 2024-10-21 ENCOUNTER — PATIENT MESSAGE (OUTPATIENT)
Dept: OBSTETRICS AND GYNECOLOGY | Facility: CLINIC | Age: 64
End: 2024-10-21
Payer: COMMERCIAL

## 2024-10-21 VITALS
BODY MASS INDEX: 32.72 KG/M2 | WEIGHT: 196.63 LBS | HEART RATE: 92 BPM | DIASTOLIC BLOOD PRESSURE: 89 MMHG | SYSTOLIC BLOOD PRESSURE: 141 MMHG

## 2024-10-21 DIAGNOSIS — N76.2 ACUTE VULVITIS: Primary | ICD-10-CM

## 2024-10-21 DIAGNOSIS — N89.8 VAGINAL DISCHARGE: ICD-10-CM

## 2024-10-21 DIAGNOSIS — Z72.89 OTHER PROBLEMS RELATED TO LIFESTYLE: ICD-10-CM

## 2024-10-21 DIAGNOSIS — R30.0 DYSURIA: ICD-10-CM

## 2024-10-21 DIAGNOSIS — Z11.3 ENCOUNTER FOR SCREENING FOR INFECTIONS WITH PREDOMINANTLY SEXUAL MODE OF TRANSMISSION: ICD-10-CM

## 2024-10-21 PROCEDURE — 3008F BODY MASS INDEX DOCD: CPT | Mod: CPTII,,,

## 2024-10-21 PROCEDURE — 99212 OFFICE O/P EST SF 10 MIN: CPT | Mod: S$PBB,,,

## 2024-10-21 PROCEDURE — 3077F SYST BP >= 140 MM HG: CPT | Mod: CPTII,,,

## 2024-10-21 PROCEDURE — 1159F MED LIST DOCD IN RCRD: CPT | Mod: CPTII,,,

## 2024-10-21 PROCEDURE — 3079F DIAST BP 80-89 MM HG: CPT | Mod: CPTII,,,

## 2024-10-21 RX ORDER — CLOTRIMAZOLE 1 %
CREAM (GRAM) TOPICAL
Qty: 30 G | Refills: 0 | Status: SHIPPED | OUTPATIENT
Start: 2024-10-21

## 2024-10-21 RX ORDER — TERCONAZOLE 4 MG/G
1 CREAM VAGINAL NIGHTLY
Qty: 45 G | Refills: 0 | Status: SHIPPED | OUTPATIENT
Start: 2024-10-21 | End: 2024-10-28

## 2024-10-21 RX ORDER — LEVOTHYROXINE SODIUM 125 UG/1
TABLET ORAL
COMMUNITY

## 2024-10-21 RX ORDER — CETIRIZINE HYDROCHLORIDE 10 MG/1
CAPSULE, LIQUID FILLED ORAL
COMMUNITY

## 2024-10-21 RX ORDER — FLUCONAZOLE 200 MG/1
TABLET ORAL
COMMUNITY
Start: 2024-10-07

## 2024-10-21 RX ORDER — MAGNESIUM 250 MG
TABLET ORAL
COMMUNITY

## 2024-10-21 RX ORDER — TRIAMCINOLONE ACETONIDE 1 MG/G
CREAM TOPICAL
Qty: 30 G | Refills: 0 | Status: SHIPPED | OUTPATIENT
Start: 2024-10-21

## 2024-10-21 NOTE — PROGRESS NOTES
Subjective:      Patient ID: Crystal Quintero is a 63 y.o. female who presents for evaluation today.    Chief Complaint:    Vaginal Discharge (Pt c/o vaginal discharge and itching for a couple of weeks now. She was treated for a yeast infection at urgent care but nothing helped )      History of Present Illness  She reports vaginal discharge and external itching for a few weeks. No odor. She was treated with diflucan for suspected yeast at urgent care but reports return of symptoms. She last took a diflucan on october 11th. She has also been recently self-treating with Augmentin from 2020. She reports history of eczema as well, managed by her PCP.    GYN History  No LMP recorded (lmp unknown). Patient is postmenopausal.   Date of Last Pap: N/A    VITALS  BP (!) 141/89   Pulse 92   Wt 89.2 kg (196 lb 9.6 oz)   LMP  (LMP Unknown)   BMI 32.72 kg/m²   Weight: 89.2 kg (196 lb 9.6 oz)         PAST MEDICAL HISTORY  Past Medical History:   Diagnosis Date    Abnormal brain MRI 2010    Abnormal EEG 2010    Abnormal mammogram 2014    [facility] evaluated- normal since    Ankle fracture     Anxiety and depression     Arm fracture     right    Asthma     Diverticulosis     Eczema     Herpes zoster without complications     History of abnormal cervical Pap smear     cryo 2008- normal after    History of Nissen fundoplication     HNPCC (hereditary nonpolyposis colorectal cancer) syndrome     +PMS2 mutation- followed at [facility]; also seeing gyn onc to consider emb vs hyst but ovary adhesed to colon    Hypothyroid     Inguinal hernia     Kidney stones     Low vitamin D level     Menopausal state     Migraines     Seborrhea     Spinal stenosis        PAST SURGICAL HISTORY  Past Surgical History:   Procedure Laterality Date    arm fracture      CERVIX LESION DESTRUCTION  2008    paps normal after    CHOLECYSTECTOMY      COLONOSCOPY      HYSTERECTOMY      NISSEN FUNDOPLICATION      OPEN REDUCTION AND INTERNAL FIXATION (ORIF) OF  FRACTURE OF LOWER LEG      sports injury    uterian biopsy         SOCIAL HISTORY  Social History     Tobacco Use   Smoking Status Never   Smokeless Tobacco Never   ,   Social History     Substance and Sexual Activity   Alcohol Use Not Currently        MEDICATIONS  Outpatient Medications Marked as Taking for the 10/21/24 encounter (Office Visit) with Nichol Estrada NP   Medication Sig Dispense Refill    albuterol (VENTOLIN HFA) 90 mcg/actuation inhaler Inhale 2 puffs into the lungs every 6 (six) hours as needed for Wheezing. Rescue 18 g 0    ascorbic acid, vitamin C, (VITAMIN C) 500 MG tablet       cetirizine (ZYRTEC) 10 mg Cap Take 1 capsule every day by oral route as directed.      cyanocobalamin (VITAMIN B-12) 100 MCG tablet Take 100 mcg by mouth once daily.      fluconazole (DIFLUCAN) 200 MG Tab TAKE 1 TABLET BY MOUTH EVERY 48 HOURS FOR 4 DAYS TAKE AT END OF ANTIBIOTIC COURSE IF NEEDED      levothyroxine (SYNTHROID) 125 MCG tablet TAKE ONE-HALF TO ONE TABLET BY MOUTH IN THE MORNING      magnesium 250 mg Tab       vitamin D (VITAMIN D3) 1000 units Tab Take 1,000 Units by mouth once daily.      vitamin K2 100 mcg Cap       zinc gluconate 50 mg tablet Take 50 mg by mouth once daily.           Review of Systems   Review of Systems   Constitutional:  Negative for activity change.   Eyes:  Negative for visual disturbance.   Respiratory:  Negative for shortness of breath.    Cardiovascular:  Negative for chest pain.   Gastrointestinal:  Negative for abdominal pain.   Genitourinary:  Positive for vaginal discharge. Negative for vaginal bleeding.        No abnormal vaginal bleeding   Musculoskeletal:  Negative for back pain.   Integumentary:  Negative for rash and breast mass.   Neurological:  Negative for numbness.   Psychiatric/Behavioral:          No mood disturbance or changes    Breast: Negative for mass          Objective:     Physical Exam:   Constitutional: She appears well-developed.                Genitourinary:    Vagina and uterus normal.            Pelvic exam was performed with patient supine.   The external female genitalia was normal.     Labial bartholins normal.There is no tenderness or lesion on the right labia. There is no tenderness or lesion on the left labia. Cervix is normal. No vaginal discharge or bleeding in the vagina.    Genitourinary Comments: Erythema to bilateral vulva                 Neurological: She is alert.            Assessment:     Acute vulvitis  -     clotrimazole (LOTRIMIN) 1 % cream; Mix 30gm of clotrimazole 1%, 30gm of triamcinolone 0.1%, 2oz of zinc oxide 20%. AAA 2-3X QD X3-5 days, then QD prn.  Dispense: 30 g; Refill: 0  -     triamcinolone acetonide 0.1% (KENALOG) 0.1 % cream; Mix 30gm of clotrimazole, 30gm of triamcinolone 0.1%, 2oz of zinc oxide. AAA 2-3X QD X3-5 days, then QD prn.  Dispense: 30 g; Refill: 0  -     terconazole (TERAZOL 7) 0.4 % Crea; Place 1 applicator vaginally every evening. for 7 days  Dispense: 45 g; Refill: 0    Encounter for screening for infections with predominantly sexual mode of transmission  -     BV PROFILE, SYMPTOMATIC    Other problems related to lifestyle  -     BV PROFILE, SYMPTOMATIC    Vaginal discharge  -     BV PROFILE, SYMPTOMATIC  -     Aerobic culture    Dysuria  -     Urinalysis, Reflex to Urine Culture Urine, Clean Catch; Future; Expected date: 10/21/2024         Plan:     Patient instructed to contact the clinic should any questions or concerns arise prior to her next office visit. Patient is happy with the plan of care at this time, verbalizes understanding and denies outstanding questions.      If you don't hear from the office regarding results within 1 week, please call  F/U in 3 weeks for vulvitis check up  Chaperone present for exam   Female chaperone present.     Patient was counseled today on vaginitis prevention including:   a. avoiding feminine products such as deodorant soaps, body wash, bubble bath, douches,  scented toilet paper, deodorant tampons or pads, feminine wipes, chronic pad use, etc.   b. avoiding other vulvovaginal irritants such as long hot baths, humidity, tight, synthetic clothing, chlorine and prolonged sitting in wet bathing suits   c. wearing cotton underwear, avoiding thong underwear and no underwear to bed   d. taking showers instead of baths and use a hair dryer on cool setting afterwards to dry   e. wearing cotton to exercise, showering immediately after exercise and changing clothes   f. using polyurethane condoms without spermicide if sexually active and symptoms are triggered by intercourse

## 2024-10-25 LAB
BACTERIAL VAGINOSIS: NEGATIVE
CANDIDA GLABRATA: NEGATIVE
CANDIDA SPECIES: NEGATIVE
CHLAMYDIA: NEGATIVE
GONORRHEA: NEGATIVE
MYCOPLASMA GENITALIUM RESULT: NEGATIVE
SOURCE: NORMAL
TRICHOMONAS VAGINALIS: NEGATIVE

## 2024-10-26 LAB — CULTURE: NORMAL

## 2024-10-29 DIAGNOSIS — N76.0 ACUTE VAGINITIS: Primary | ICD-10-CM

## 2024-10-29 RX ORDER — CIPROFLOXACIN 500 MG/1
500 TABLET ORAL 2 TIMES DAILY
Qty: 14 TABLET | Refills: 0 | Status: SHIPPED | OUTPATIENT
Start: 2024-10-29 | End: 2024-10-31

## 2024-10-30 ENCOUNTER — PATIENT MESSAGE (OUTPATIENT)
Dept: OBSTETRICS AND GYNECOLOGY | Facility: CLINIC | Age: 64
End: 2024-10-30
Payer: COMMERCIAL

## 2024-10-31 DIAGNOSIS — N76.0 ACUTE VAGINITIS: Primary | ICD-10-CM

## 2024-10-31 RX ORDER — SULFAMETHOXAZOLE AND TRIMETHOPRIM 800; 160 MG/1; MG/1
1 TABLET ORAL 2 TIMES DAILY
Qty: 14 TABLET | Refills: 0 | Status: SHIPPED | OUTPATIENT
Start: 2024-10-31 | End: 2024-11-07

## 2024-11-13 ENCOUNTER — OFFICE VISIT (OUTPATIENT)
Dept: OBSTETRICS AND GYNECOLOGY | Facility: CLINIC | Age: 64
End: 2024-11-13
Payer: COMMERCIAL

## 2024-11-13 VITALS
WEIGHT: 198.19 LBS | HEART RATE: 69 BPM | DIASTOLIC BLOOD PRESSURE: 79 MMHG | BODY MASS INDEX: 32.98 KG/M2 | SYSTOLIC BLOOD PRESSURE: 135 MMHG

## 2024-11-13 DIAGNOSIS — Z09 ENCOUNTER FOR FOLLOW-UP: Primary | ICD-10-CM

## 2024-11-13 DIAGNOSIS — N76.2 ACUTE VULVITIS: ICD-10-CM

## 2024-11-13 PROCEDURE — 3075F SYST BP GE 130 - 139MM HG: CPT | Mod: CPTII,,,

## 2024-11-13 PROCEDURE — 3078F DIAST BP <80 MM HG: CPT | Mod: CPTII,,,

## 2024-11-13 PROCEDURE — 1160F RVW MEDS BY RX/DR IN RCRD: CPT | Mod: CPTII,,,

## 2024-11-13 PROCEDURE — 99212 OFFICE O/P EST SF 10 MIN: CPT | Mod: S$PBB,,,

## 2024-11-13 PROCEDURE — 3008F BODY MASS INDEX DOCD: CPT | Mod: CPTII,,,

## 2024-11-13 PROCEDURE — 1159F MED LIST DOCD IN RCRD: CPT | Mod: CPTII,,,

## 2024-11-13 NOTE — PROGRESS NOTES
Subjective:      Patient ID: Crystal Quintero is a 63 y.o. female who presents for evaluation today.    Chief Complaint:    VULVITIS F/U (Pt has no c/o )      History of Present Illness  HPI She presents for 1 mos vulvitis f/u. She is doing well, has used vulvitis creams, completed terazol 7, & bactrim. She denies further irritation nor problems today.     GYN History  No LMP recorded (lmp unknown). Patient is postmenopausal.   Date of Last Pap: N/A    VITALS  /79 (BP Location: Left forearm, Patient Position: Sitting)   Pulse 69   Wt 89.9 kg (198 lb 3.2 oz)   LMP  (LMP Unknown)   BMI 32.98 kg/m²   Weight: 89.9 kg (198 lb 3.2 oz)         PAST MEDICAL HISTORY  Past Medical History:   Diagnosis Date    Abnormal brain MRI 2010    Abnormal EEG 2010    Abnormal mammogram 2014    [facility] evaluated- normal since    Ankle fracture     Anxiety and depression     Arm fracture     right    Asthma     Diverticulosis     Eczema     Herpes zoster without complications     History of abnormal cervical Pap smear     cryo 2008- normal after    History of Nissen fundoplication     HNPCC (hereditary nonpolyposis colorectal cancer) syndrome     +PMS2 mutation- followed at [facility]; also seeing gyn onc to consider emb vs hyst but ovary adhesed to colon    Hypothyroid     Inguinal hernia     Kidney stones     Low vitamin D level     Menopausal state     Migraines     Seborrhea     Spinal stenosis        PAST SURGICAL HISTORY  Past Surgical History:   Procedure Laterality Date    arm fracture      CERVIX LESION DESTRUCTION  2008    paps normal after    CHOLECYSTECTOMY      COLONOSCOPY      HYSTERECTOMY      NISSEN FUNDOPLICATION      OPEN REDUCTION AND INTERNAL FIXATION (ORIF) OF FRACTURE OF LOWER LEG      sports injury    uterian biopsy         SOCIAL HISTORY  Social History     Tobacco Use   Smoking Status Never   Smokeless Tobacco Never   ,   Social History     Substance and Sexual Activity   Alcohol Use Not Currently         MEDICATIONS  Outpatient Medications Marked as Taking for the 11/13/24 encounter (Office Visit) with Nichol Estrada NP   Medication Sig Dispense Refill    albuterol (VENTOLIN HFA) 90 mcg/actuation inhaler Inhale 2 puffs into the lungs every 6 (six) hours as needed for Wheezing. Rescue 18 g 0    ascorbic acid, vitamin C, (VITAMIN C) 500 MG tablet       cetirizine (ZYRTEC) 10 mg Cap Take 1 capsule every day by oral route as directed.      clotrimazole (LOTRIMIN) 1 % cream Mix 30gm of clotrimazole 1%, 30gm of triamcinolone 0.1%, 2oz of zinc oxide 20%. AAA 2-3X QD X3-5 days, then QD prn. 30 g 0    cyanocobalamin (VITAMIN B-12) 100 MCG tablet Take 100 mcg by mouth once daily.      levothyroxine (SYNTHROID) 125 MCG tablet TAKE ONE-HALF TO ONE TABLET BY MOUTH IN THE MORNING      magnesium 250 mg Tab       montelukast sodium (MONTELUKAST ORAL) Take 10 mg by mouth as needed.      vitamin D (VITAMIN D3) 1000 units Tab Take 1,000 Units by mouth once daily.      vitamin K2 100 mcg Cap       zinc gluconate 50 mg tablet Take 50 mg by mouth once daily.           Review of Systems   Review of Systems   Constitutional:  Negative for activity change.   Eyes:  Negative for visual disturbance.   Respiratory:  Negative for shortness of breath.    Cardiovascular:  Negative for chest pain.   Gastrointestinal:  Negative for abdominal pain.   Genitourinary:  Negative for vaginal bleeding.        No abnormal vaginal bleeding   Musculoskeletal:  Negative for back pain.   Integumentary:  Negative for rash and breast mass.   Neurological:  Negative for numbness.   Psychiatric/Behavioral:          No mood disturbance or changes    Breast: Negative for mass          Objective:     Physical Exam:   Constitutional: Vital signs are normal.             Abdominal: Soft. She exhibits no distension. There is no abdominal tenderness.     Genitourinary:    Pelvic exam was performed with patient in the lithotomy position.   The external female  genitalia was normal.     Labial bartholins normal.There is no rash or tenderness on the right labia. There is no rash or tenderness on the left labia.    Genitourinary Comments: No visible erythema or irritation                          Assessment:     Encounter for follow-up    Acute vulvitis         Plan:     Patient instructed to contact the clinic should any questions or concerns arise prior to her next office visit. Patient is happy with the plan of care at this time, verbalizes understanding and denies outstanding questions.      Great improvement noted, may continue creams prn  Follow up for annual or sooner as needed  Chaperone present for exam   Female chaperone present.

## 2025-07-22 ENCOUNTER — TELEPHONE (OUTPATIENT)
Dept: OBSTETRICS AND GYNECOLOGY | Facility: CLINIC | Age: 65
End: 2025-07-22
Payer: COMMERCIAL

## 2025-07-22 NOTE — TELEPHONE ENCOUNTER
Patient c/o same issues she had a few months back. She is having discharge, odor and some itching. She is leaving out of town Friday so she wanted to see what she needed to do.   Do you want me to make an appointment?  MEME

## 2025-07-23 ENCOUNTER — OFFICE VISIT (OUTPATIENT)
Dept: OBSTETRICS AND GYNECOLOGY | Facility: CLINIC | Age: 65
End: 2025-07-23
Payer: COMMERCIAL

## 2025-07-23 VITALS
BODY MASS INDEX: 33.22 KG/M2 | WEIGHT: 199.38 LBS | SYSTOLIC BLOOD PRESSURE: 141 MMHG | HEART RATE: 81 BPM | DIASTOLIC BLOOD PRESSURE: 88 MMHG | HEIGHT: 65 IN

## 2025-07-23 DIAGNOSIS — N89.8 VAGINAL DISCHARGE: Primary | ICD-10-CM

## 2025-07-23 DIAGNOSIS — N76.0 ACUTE VAGINITIS: ICD-10-CM

## 2025-07-23 RX ORDER — LEVOTHYROXINE SODIUM 100 UG/1
100 TABLET ORAL EVERY MORNING
COMMUNITY
Start: 2025-06-18

## 2025-07-23 RX ORDER — FLUCONAZOLE 150 MG/1
150 TABLET ORAL EVERY OTHER DAY
Qty: 2 TABLET | Refills: 1 | Status: SHIPPED | OUTPATIENT
Start: 2025-07-23 | End: 2025-07-26

## 2025-07-23 RX ORDER — DIAZEPAM 5 MG/1
5 TABLET ORAL EVERY 6 HOURS PRN
COMMUNITY

## 2025-07-23 RX ORDER — SULFAMETHOXAZOLE AND TRIMETHOPRIM 800; 160 MG/1; MG/1
1 TABLET ORAL 2 TIMES DAILY
Qty: 14 TABLET | Refills: 0 | Status: SHIPPED | OUTPATIENT
Start: 2025-07-23 | End: 2025-07-30

## 2025-07-23 NOTE — PROGRESS NOTES
"  Subjective:      Patient ID: Crystal Quintero is a 64 y.o. female who presents for evaluation today.    Chief Complaint:    Vaginal Discharge      History of Present Illness  HPI She is leaving for [place] on Friday and notes a slight vaginal discharge. She wants to ensure everything is clear. She denies itching, no odor. She was treated with bactrim in November for a staph bacteria and wants to have some on hand incase this happens again. She denies urinary symptoms.     GYN History  No LMP recorded (lmp unknown). Patient is postmenopausal.   Date of Last Pap: N/A    VITALS  BP (!) 141/88 (BP Location: Left arm, Patient Position: Sitting)   Pulse 81   Ht 5' 5" (1.651 m)   Wt 90.4 kg (199 lb 6.4 oz)   LMP  (LMP Unknown)   BMI 33.18 kg/m²   Weight: 90.4 kg (199 lb 6.4 oz)   Height: 5' 5" (165.1 cm)     PAST MEDICAL HISTORY  Past Medical History:   Diagnosis Date    Abnormal brain MRI 2010    Abnormal EEG 2010    Abnormal mammogram [address] [place] [address] evaluated- normal since    Ankle fracture     Anxiety and depression     Arm fracture     right    Asthma     Diverticulosis     Eczema     Herpes zoster without complications     History of abnormal cervical Pap smear     cryo 2008- normal after    History of Nissen fundoplication     HNPCC (hereditary nonpolyposis colorectal cancer) syndrome     +PMS2 mutation- followed at [facility]; also seeing gyn onc to consider emb vs hyst but ovary adhesed to colon    Hypothyroid     Inguinal hernia     Kidney stones     Low vitamin D level     Menopausal state     Migraines     Seborrhea     Spinal stenosis        PAST SURGICAL HISTORY  Past Surgical History:   Procedure Laterality Date    arm fracture      CERVIX LESION DESTRUCTION  2008    paps normal after    CHOLECYSTECTOMY      COLONOSCOPY      HYSTERECTOMY      NISSEN FUNDOPLICATION      OPEN REDUCTION AND INTERNAL FIXATION (ORIF) OF FRACTURE OF LOWER LEG      sports injury    uterian biopsy         SOCIAL " HISTORY  Tobacco Use History[1],   Social History     Substance and Sexual Activity   Alcohol Use Not Currently        MEDICATIONS  Outpatient Medications Marked as Taking for the 7/23/25 encounter (Office Visit) with Nichol Estrada NP   Medication Sig Dispense Refill    ascorbic acid, vitamin C, (VITAMIN C) 500 MG tablet       cetirizine (ZYRTEC) 10 mg Cap Take 1 capsule every day by oral route as directed.      clotrimazole (LOTRIMIN) 1 % cream Mix 30gm of clotrimazole 1%, 30gm of triamcinolone 0.1%, 2oz of zinc oxide 20%. AAA 2-3X QD X3-5 days, then QD prn. 30 g 0    cyanocobalamin (VITAMIN B-12) 100 MCG tablet Take 100 mcg by mouth once daily.      diazePAM (VALIUM) 5 MG tablet Take 5 mg by mouth every 6 (six) hours as needed for Anxiety.      levothyroxine (SYNTHROID) 100 MCG tablet Take 100 mcg by mouth every morning.      magnesium 250 mg Tab       montelukast sodium (MONTELUKAST ORAL) Take 10 mg by mouth as needed.      triamcinolone acetonide 0.1% (KENALOG) 0.1 % cream Mix 30gm of clotrimazole, 30gm of triamcinolone 0.1%, 2oz of zinc oxide. AAA 2-3X QD X3-5 days, then QD prn. 30 g 0    vitamin D (VITAMIN D3) 1000 units Tab Take 1,000 Units by mouth once daily.      vitamin K2 100 mcg Cap       zinc gluconate 50 mg tablet Take 50 mg by mouth once daily.           Review of Systems   Review of Systems   Constitutional:  Negative for activity change.   Eyes:  Negative for visual disturbance.   Respiratory:  Negative for shortness of breath.    Cardiovascular:  Negative for chest pain.   Gastrointestinal:  Negative for abdominal pain.   Genitourinary:  Positive for vaginal discharge. Negative for vaginal bleeding.        No abnormal vaginal bleeding   Musculoskeletal:  Negative for back pain.   Integumentary:  Negative for rash and breast mass.   Neurological:  Negative for numbness.   Psychiatric/Behavioral:          No mood disturbance or changes    Breast: Negative for mass          Objective:      Physical Exam:   Constitutional: She appears well-developed.               Genitourinary:    Vagina and uterus normal.      Pelvic exam was performed with patient in the lithotomy position.   The external female genitalia was normal.     Labial bartholins normal.There is no rash, tenderness or lesion on the right labia. There is no rash, tenderness or lesion on the left labia. Cervix is normal. No vaginal discharge or bleeding in the vagina.               Neurological: She is alert.            Assessment:     Vaginal discharge  -     Aerobic culture    Acute vaginitis  -     sulfamethoxazole-trimethoprim 800-160mg (BACTRIM DS) 800-160 mg Tab; Take 1 tablet by mouth 2 (two) times daily. for 7 days  Dispense: 14 tablet; Refill: 0  -     fluconazole (DIFLUCAN) 150 MG Tab; Take 1 tablet (150 mg total) by mouth every other day. for 2 doses  Dispense: 2 tablet; Refill: 1  -     Aerobic culture         Plan:     Patient instructed to contact the clinic should any questions or concerns arise prior to her next office visit. Patient is happy with the plan of care at this time, verbalizes understanding and denies outstanding questions.      Discussed boric acid suppositories for pH balance  If you don't hear from the office regarding results within 1 week, please call  Follow up for annual or sooner as needed  Chaperone present for exam   Female chaperone present.     Patient was counseled today on vaginitis prevention including:   a. avoiding feminine products such as deodorant soaps, body wash, bubble bath, douches, scented toilet paper, deodorant tampons or pads, feminine wipes, chronic pad use, etc.   b. avoiding other vulvovaginal irritants such as long hot baths, humidity, tight, synthetic clothing, chlorine and prolonged sitting in wet bathing suits   c. wearing cotton underwear, avoiding thong underwear and no underwear to bed   d. taking showers instead of baths and use a hair dryer on cool setting afterwards to dry    e. wearing cotton to exercise, showering immediately after exercise and changing clothes   f. using polyurethane condoms without spermicide if sexually active and symptoms are triggered by intercourse          [1]   Social History  Tobacco Use   Smoking Status Never   Smokeless Tobacco Never

## 2025-07-25 LAB — CULTURE: NORMAL

## 2025-09-03 ENCOUNTER — OFFICE VISIT (OUTPATIENT)
Dept: OBSTETRICS AND GYNECOLOGY | Facility: CLINIC | Age: 65
End: 2025-09-03
Payer: COMMERCIAL

## 2025-09-03 VITALS
BODY MASS INDEX: 32.99 KG/M2 | SYSTOLIC BLOOD PRESSURE: 119 MMHG | WEIGHT: 198 LBS | DIASTOLIC BLOOD PRESSURE: 78 MMHG | HEIGHT: 65 IN | HEART RATE: 80 BPM

## 2025-09-03 DIAGNOSIS — N95.2 VAGINAL ATROPHY: ICD-10-CM

## 2025-09-03 DIAGNOSIS — Z12.39 ENCOUNTER FOR BREAST CANCER SCREENING USING NON-MAMMOGRAM MODALITY: ICD-10-CM

## 2025-09-03 DIAGNOSIS — Z01.419 WELL WOMAN EXAM WITH ROUTINE GYNECOLOGICAL EXAM: Primary | ICD-10-CM

## 2025-09-03 PROCEDURE — 1160F RVW MEDS BY RX/DR IN RCRD: CPT | Mod: CPTII,S$GLB,,

## 2025-09-03 PROCEDURE — 3078F DIAST BP <80 MM HG: CPT | Mod: CPTII,S$GLB,,

## 2025-09-03 PROCEDURE — 3008F BODY MASS INDEX DOCD: CPT | Mod: CPTII,S$GLB,,

## 2025-09-03 PROCEDURE — 99396 PREV VISIT EST AGE 40-64: CPT | Mod: S$GLB,,,

## 2025-09-03 PROCEDURE — 1159F MED LIST DOCD IN RCRD: CPT | Mod: CPTII,S$GLB,,

## 2025-09-03 PROCEDURE — 3074F SYST BP LT 130 MM HG: CPT | Mod: CPTII,S$GLB,,

## 2025-09-03 RX ORDER — ESTRADIOL 0.1 MG/G
1 CREAM VAGINAL
Qty: 42.5 G | Refills: 1 | Status: SHIPPED | OUTPATIENT
Start: 2025-09-04 | End: 2026-09-04